# Patient Record
Sex: FEMALE | Race: WHITE | NOT HISPANIC OR LATINO | Employment: UNEMPLOYED | URBAN - METROPOLITAN AREA
[De-identification: names, ages, dates, MRNs, and addresses within clinical notes are randomized per-mention and may not be internally consistent; named-entity substitution may affect disease eponyms.]

---

## 2017-05-08 ENCOUNTER — OFFICE VISIT (OUTPATIENT)
Dept: FAMILY MEDICINE | Facility: CLINIC | Age: 57
End: 2017-05-08

## 2017-05-08 VITALS
HEART RATE: 68 BPM | BODY MASS INDEX: 27.98 KG/M2 | DIASTOLIC BLOOD PRESSURE: 100 MMHG | WEIGHT: 184.6 LBS | HEIGHT: 68 IN | TEMPERATURE: 97.5 F | SYSTOLIC BLOOD PRESSURE: 130 MMHG

## 2017-05-08 DIAGNOSIS — I10 ESSENTIAL HYPERTENSION, BENIGN: ICD-10-CM

## 2017-05-08 DIAGNOSIS — F41.9 ANXIETY: ICD-10-CM

## 2017-05-08 DIAGNOSIS — I49.3 PVC'S (PREMATURE VENTRICULAR CONTRACTIONS): ICD-10-CM

## 2017-05-08 DIAGNOSIS — I77.89 HYPERPLASIA OF RENAL ARTERY (H): ICD-10-CM

## 2017-05-08 PROCEDURE — 80048 BASIC METABOLIC PNL TOTAL CA: CPT | Mod: 90 | Performed by: FAMILY MEDICINE

## 2017-05-08 PROCEDURE — 36415 COLL VENOUS BLD VENIPUNCTURE: CPT | Performed by: FAMILY MEDICINE

## 2017-05-08 PROCEDURE — 99213 OFFICE O/P EST LOW 20 MIN: CPT | Performed by: FAMILY MEDICINE

## 2017-05-08 RX ORDER — SERTRALINE HYDROCHLORIDE 100 MG/1
100 TABLET, FILM COATED ORAL DAILY
Qty: 90 TABLET | Refills: 1 | Status: SHIPPED | OUTPATIENT
Start: 2017-05-08 | End: 2017-12-13

## 2017-05-08 RX ORDER — METOPROLOL SUCCINATE 50 MG/1
75 TABLET, EXTENDED RELEASE ORAL DAILY
Qty: 135 TABLET | Refills: 1 | Status: SHIPPED | OUTPATIENT
Start: 2017-05-08 | End: 2017-06-16

## 2017-05-08 RX ORDER — LISINOPRIL AND HYDROCHLOROTHIAZIDE 12.5; 2 MG/1; MG/1
1 TABLET ORAL DAILY
Qty: 90 TABLET | Refills: 1 | Status: SHIPPED | OUTPATIENT
Start: 2017-05-08 | End: 2017-06-16

## 2017-05-08 NOTE — PROGRESS NOTES
SUBJECTIVE:                                                    Ke Sloan is a 56 year old female who presents to clinic today for the following health issues:     Hypertension Follow-up      Outpatient blood pressures are not being checked.    Low Salt Diet: no added salt       Amount of exercise or physical activity: no regular exercise program- working long hours- poor life balance    Problems taking medications regularly: No    Medication side effects: none    Diet: low salt      Anxiety Follow-Up    Status since last visit: Worsened (work stress)    Other associated symptoms:None    Complicating factors:   Significant life event: Yes-  work   Current substance abuse: None  Depression symptoms: No  BRIAN-7 SCORE 11/13/2015 5/18/2016 11/14/2016   Total Score - - -   Total Score 3 0 1        GAD7        Problem list and histories reviewed & adjusted, as indicated.  Additional history: as documented    History of renal artery stenosis  With uncontrolled blood pressure, ?need for follow up imaging  Evaluated for possible sleep apnea last December- never had sleep study- wanted to see if weight loss changed her symptoms    History of PVC's:asymptomatic    Patient Active Problem List   Diagnosis     Other chronic allergic conjunctivitis     Essential hypertension, benign     ACP (advance care planning)     Frequent PVCs     Hyperplasia of renal artery/ angioplasty 2008 bilateral renal arteries     Health Care Home     Anxiety     Past Surgical History:   Procedure Laterality Date     C LIGATE FALLOPIAN TUBE      AGE 32     C NONSPECIFIC PROCEDURE  1998    LASIK EYE SURGERY     COLONOSCOPY  6/2011    Normal/ rpt in 10 yrs     HC PTA RENAL/VISCERAL ARTERY  2008    fibromuscular dysplasia, renal       Social History   Substance Use Topics     Smoking status: Never Smoker     Smokeless tobacco: Never Used     Alcohol use 0.5 oz/week     1 drink(s) per week      Comment: weekends     Family History   Problem Relation  "Age of Onset     Hypertension Mother      Hypertension Father      DIABETES Maternal Grandmother      INSULIN     CEREBROVASCULAR DISEASE Father      60 YEARS OLD WHEN HE HAD A CVA     Respiratory Paternal Grandfather      EMPHYSEMA     Thyroid Disease Sister      PARTIAL THYROIDECTOMY     CANCER Sister      thyroid     Hypertension Sister      DIABETES Mother      Respiratory Mother      emphysema     Anxiety Disorder Mother      Anxiety Disorder Sister      Sleep Apnea Father          Current Outpatient Prescriptions   Medication Sig Dispense Refill     sertraline (ZOLOFT) 100 MG tablet Take 1 tablet (100 mg) by mouth daily 90 tablet 1     metoprolol (TOPROL-XL) 50 MG 24 hr tablet Take 1.5 tablets (75 mg) by mouth daily 135 tablet 1     lisinopril-hydrochlorothiazide (PRINZIDE/ZESTORETIC) 20-12.5 MG per tablet Take 1 tablet by mouth daily 90 tablet 1     olopatadine HCl (PATADAY) 0.2 % SOLN Place 1 drop into both eyes daily 2.5 mL 3       Reviewed and updated as needed this visit by clinical staff  Tobacco  Allergies  Meds  Problems       Reviewed and updated as needed this visit by Provider    Doses both pills at night  HR 72    162/100         ROS:  C: NEGATIVE for fever, chills, change in weight  E/M: NEGATIVE for ear, mouth and throat problems  R: NEGATIVE for significant cough or SOB  CV: NEGATIVE for chest pain, palpitations or peripheral edema  PSYCHIATRIC: stressed- see above    OBJECTIVE:                                                    BP (!) 130/100 (BP Location: Left leg, Patient Position: Chair, Cuff Size: Adult Regular)  Pulse 68  Temp 97.5  F (36.4  C) (Oral)  Ht 1.715 m (5' 7.5\")  Wt 83.7 kg (184 lb 9.6 oz)  LMP 04/20/2013  Breastfeeding? No  BMI 28.49 kg/m2  Body mass index is 28.49 kg/(m^2).  No acute distress.  Regular rate and Rhythm- occasional PVC. no murmurs, clicks, gallops or rubs. No edema or JVD. Chest is clear; no wheezes or rales.  ALert and oriented times 3; Coherent " speech, normal rate and volume, able to articulate, logical thoughts, able to abstract reason, no tangential thoughts, no hallucinations or delusions. Affect is pleasant      Diagnostic Test Results:  Results for orders placed or performed in visit on 05/08/17   BASIC METABOLIC PANEL (QUEST)   Result Value Ref Range    Glucose 93 65 - 99 mg/dL    Urea Nitrogen 18 7 - 25 mg/dL    Creatinine 0.70 0.50 - 1.05 mg/dL    GFR Estimate 98 > OR = 60 mL/min/1.73m2    EGFR African American 113 > OR = 60 mL/min/1.73m2    BUN/Creatinine Ratio NOT APPLICABLE 6 - 22 (calc)    Sodium 138 135 - 146 mmol/L    Potassium 3.8 3.5 - 5.3 mmol/L    Chloride 102 98 - 110 mmol/L    Carbon Dioxide 27 20 - 31 mmol/L    Calcium 10.2 8.6 - 10.4 mg/dL        ASSESSMENT/PLAN:                                                      Problem List Items Addressed This Visit     Anxiety    Essential hypertension, benign      Other Visit Diagnoses     PVC's (premature ventricular contractions)                   MEDICATIONS:        - Increase metoprolol to 75 mg        - Continue other medications without change  FUTURE APPOINTMENTS:       - Follow-up visit in one month  Regular exercise  ?? Need for further renal artery evaluation    Natalia Ordonez MD  [facility]

## 2017-05-08 NOTE — MR AVS SNAPSHOT
After Visit Summary   5/8/2017    Ke Sloan    MRN: [id number]           Patient Information     Date Of Birth          1960        Visit Information        Provider Department      5/8/2017 5:15 PM Natalia Ordonez MD [facility]        Today's Diagnoses     Anxiety        Essential hypertension, benign        PVC's (premature ventricular contractions)          Care Instructions    Recheck blood pressure in one month (3-4 weeks)          Follow-ups after your visit        Your next 10 appointments already scheduled     Jun 06, 2017  2:00 PM CDT   Return Sleep Patient with Bennett Ezra Goltz, PA-C   [facility] ([facility])    [address] [place] [address] [phone]              Who to contact     If you have questions or need follow up information about today's clinic visit or your schedule please contact [facility] directly at [phone].  Normal or non-critical lab and imaging results will be communicated to you by [email or web address], letter or phone within 4 business days after the clinic has received the results. If you do not hear from us within 7 days, please contact the clinic through [employer] or phone. If you have a critical or abnormal lab result, we will notify you by phone as soon as possible.  Submit refill requests through [employer] or call your pharmacy and they will forward the refill request to us. Please allow 3 business days for your refill to be completed.          Additional Information About Your Visit        MyChart Information     [employer] gives you secure access to your electronic health record. If you see a primary care provider, you can also send messages to your care team and make appointments. If you have questions, please call your primary care clinic.  If you do not have a primary care provider, please call [phone] and they will assist you.        Care  "EveryWhere ID     This is your Care EveryWhere ID. This could be used by other organizations to access your [facility] medical records  [id number]        Your Vitals Were     Pulse Temperature Height Last Period Breastfeeding? BMI (Body Mass Index)    68 97.5  F (36.4  C) (Oral) 1.715 m (5' 7.5\") 04/20/2013 No 28.49 kg/m2       Blood Pressure from Last 3 Encounters:   05/08/17 (!) 130/100   12/07/16 114/80   12/06/16 131/85    Weight from Last 3 Encounters:   05/08/17 83.7 kg (184 lb 9.6 oz)   12/07/16 82.3 kg (181 lb 6.4 oz)   12/06/16 83.5 kg (184 lb)              We Performed the Following     BASIC METABOLIC PANEL (QUEST)     VENOUS COLLECTION          Today's Medication Changes          These changes are accurate as of: 5/8/17  6:06 PM.  If you have any questions, ask your nurse or doctor.               These medicines have changed or have updated prescriptions.        Dose/Directions    metoprolol 50 MG 24 hr tablet   Commonly known as:  TOPROL-XL   This may have changed:  how much to take   Used for:  Essential hypertension, benign, PVC's (premature ventricular contractions)   Changed by:  Natalia Ordonez MD        Dose:  75 mg   Take 1.5 tablets (75 mg) by mouth daily   Quantity:  135 tablet   Refills:  1            Where to get your medicines      These medications were sent to [place] [address] [place] [address] [address]    Hours:  Tech issues with their phone system Phone:  [phone]     lisinopril-hydrochlorothiazide 20-12.5 MG per tablet    metoprolol 50 MG 24 hr tablet    sertraline 100 MG tablet                Primary Care Provider Office Phone # Fax #    Natalia Ordonez [phone]       [facility] [address] [facility] [address] [facility] [facility] [address]  [facility] [address]        Thank you!     Thank you for choosing [facility]  for your care. Our goal is always to provide you with excellent care. Hearing " back from our patients is one way we can continue to improve our services. Please take a few minutes to complete the written survey that you may receive in the mail after your visit with us. Thank you!             Your Updated Medication List - Protect others around you: Learn how to safely use, store and throw away your medicines at www.disposemymeds.org.          This list is accurate as of: 5/8/17  6:06 PM.  Always use your most recent med list.                   Brand Name Dispense Instructions for use    lisinopril-hydrochlorothiazide 20-12.5 MG per tablet    PRINZIDE/ZESTORETIC    90 tablet    Take 1 tablet by mouth daily       metoprolol 50 MG 24 hr tablet    TOPROL-XL    135 tablet    Take 1.5 tablets (75 mg) by mouth daily       olopatadine HCl 0.2 % Soln    PATADAY    2.5 mL    Place 1 drop into both eyes daily       sertraline 100 MG tablet    ZOLOFT    90 tablet    Take 1 tablet (100 mg) by mouth daily

## 2017-05-08 NOTE — NURSING NOTE
Ke is here for a medication recheck.     Pre-Visit Screening :  Immunizations : up to date    Colonoscopy : is up to date  Mammogram : is up to date  Asthma Action Test/Plan : na  PHQ9/GAD7 :  Na    Pulse - regular  My Chart - accepts    CLASSIFICATION OF OVERWEIGHT AND OBESITY BY BMI                         Obesity Class           BMI(kg/m2)  Underweight                                    < 18.5  Normal                                         18.5-24.9  Overweight                                     25.0-29.9  OBESITY                     I                  30.0-34.9                              II                 35.0-39.9  EXTREME OBESITY             III                >40                             Patient's  BMI Body mass index is 28.49 kg/(m^2).  http://hin.nhlbi.nih.gov/menuplanner/menu.cgi  Questioned patient about current smoking habits.  Pt. has never smoked.  AVINASH Colby ([facility])

## 2017-05-10 LAB
BUN SERPL-MCNC: 18 MG/DL (ref 7–25)
BUN/CREATININE RATIO: NORMAL (CALC) (ref 6–22)
CALCIUM SERPL-MCNC: 10.2 MG/DL (ref 8.6–10.4)
CHLORIDE SERPLBLD-SCNC: 102 MMOL/L (ref 98–110)
CO2 SERPL-SCNC: 27 MMOL/L (ref 20–31)
CREAT SERPL-MCNC: 0.7 MG/DL (ref 0.5–1.05)
EGFR AFRICAN AMERICAN - QUEST: 113 ML/MIN/1.73M2
GFR SERPL CREATININE-BSD FRML MDRD: 98 ML/MIN/1.73M2
GLUCOSE - QUEST: 93 MG/DL (ref 65–99)
POTASSIUM SERPL-SCNC: 3.8 MMOL/L (ref 3.5–5.3)
SODIUM SERPL-SCNC: 138 MMOL/L (ref 135–146)

## 2017-06-09 ENCOUNTER — OFFICE VISIT (OUTPATIENT)
Dept: FAMILY MEDICINE | Facility: CLINIC | Age: 57
End: 2017-06-09

## 2017-06-09 VITALS
OXYGEN SATURATION: 97 % | SYSTOLIC BLOOD PRESSURE: 140 MMHG | TEMPERATURE: 97.9 F | HEART RATE: 73 BPM | WEIGHT: 185.6 LBS | HEIGHT: 68 IN | DIASTOLIC BLOOD PRESSURE: 110 MMHG | BODY MASS INDEX: 28.13 KG/M2

## 2017-06-09 DIAGNOSIS — I10 HYPERTENSION, UNCONTROLLED: Primary | ICD-10-CM

## 2017-06-09 PROCEDURE — 99213 OFFICE O/P EST LOW 20 MIN: CPT | Performed by: FAMILY MEDICINE

## 2017-06-09 NOTE — PROGRESS NOTES
"SUBJECTIVE:  57 year old female presents for recheck of her blood pressure after an increase in dose of metoprolol-  More tired on the medication-   Job stress- raising beta blocker had little impact on blood pressure- more side effects  History of FMD- renal artery angioplasty 2007    She had a mild headache this week  She denies chest pain  Did have a palpitation in the last month-?? PVC   l    Lab Results   Component Value Date    CR 0.70 05/08/2017     OBJECTIVE:  BP (!) 140/110 (BP Location: Left arm, Patient Position: Chair, Cuff Size: Adult Regular)  Pulse 73  Temp 97.9  F (36.6  C) (Oral)  Ht 1.715 m (5' 7.5\")  Wt 84.2 kg (185 lb 9.6 oz)  LMP 04/20/2013  SpO2 97%  Breastfeeding? No  BMI 28.64 kg/m2     No exam -    More than 50% of visit spent in counseling.  We talked about options for treatment:  1) referral to vascular surgeon- ? Need to reevaluate renal arteries  2) increase dose of ace  3) consult with Dr Jin to help guide treatment- further heart and renal evaluations- chosen option  No change in medications today as I expect Dr Jin will have recommendations-  Referral sent  4) manage stress   "

## 2017-06-09 NOTE — NURSING NOTE
Ke is here for a blood pressure recheck.     Pre-Visit Screening :  Immunizations : up to date    Colonoscopy : Up to date  Mammogram : is up to date  Asthma Action Test/Plan : Na  PHQ9/GAD7 :  utd    Pulse - regular  My Chart - accepts    CLASSIFICATION OF OVERWEIGHT AND OBESITY BY BMI                         Obesity Class           BMI(kg/m2)  Underweight                                    < 18.5  Normal                                         18.5-24.9  Overweight                                     25.0-29.9  OBESITY                     I                  30.0-34.9                              II                 35.0-39.9  EXTREME OBESITY             III                >40                             Patient's  BMI Body mass index is 28.64 kg/(m^2).  http://hin.nhlbi.nih.gov/menuplanner/menu.cgi  Questioned patient about current smoking habits.  Pt. has never smoked.    AVINASH Colby ([facility])

## 2017-06-09 NOTE — MR AVS SNAPSHOT
After Visit Summary   6/9/2017    Ke Sloan    MRN: [id number]           Patient Information     Date Of Birth          1960        Visit Information        Provider Department      6/9/2017 8:30 AM Sterner, Natalia J, MD [facility]        Today's Diagnoses     Hypertension, uncontrolled    -  1       Follow-ups after your visit        Additional Services     CARDIOLOGY EVAL ADULT REFERRAL       Your provider has referred you to:  [facility]: [facility] [phone]   [email or web address]    Call patient mobile phone for appointment  [phone]    Please be aware that coverage of these services is subject to the terms and limitations of your health insurance plan.  Call member services at your health plan with any benefit or coverage questions.      Type of Referral:  New Cardiology Consult: DR DYER REQUESTED    Timeframe requested:  1-2 Weeks  UNCONTROLLED BLOOD PRESSURE; HIGH DIASTOLIC  HISTORY OF FMD RENAL ARTERIES: ANGIOPLASTY 2007  Please bring the following to your appointment:  >>   Any x-rays, CTs or MRIs which have been performed.  Contact the facility where they were done to arrange for  prior to your scheduled appointment.    >>   List of current medications  >>   This referral request   >>   Any documents/labs given to you for this referral                  Your next 10 appointments already scheduled     Jun 13, 2017  2:00 PM CDT   Return Sleep Patient with Bennett Ezra Goltz, PA-C   [facility] ([facility])    [address] [place] [address] [phone]              Who to contact     If you have questions or need follow up information about today's clinic visit or your schedule please contact [facility] directly at [phone].  Normal or non-critical lab and imaging results  "will be communicated to you by MyChart, letter or phone within 4 business days after the clinic has received the results. If you do not hear from us within 7 days, please contact the clinic through [employer] or phone. If you have a critical or abnormal lab result, we will notify you by phone as soon as possible.  Submit refill requests through [employer] or call your pharmacy and they will forward the refill request to us. Please allow 3 business days for your refill to be completed.          Additional Information About Your Visit        [employer] Information     [employer] gives you secure access to your electronic health record. If you see a primary care provider, you can also send messages to your care team and make appointments. If you have questions, please call your primary care clinic.  If you do not have a primary care provider, please call [phone] and they will assist you.        Care EveryWhere ID     This is your Care EveryWhere ID. This could be used by other organizations to access your Center medical records  [id number]        Your Vitals Were     Pulse Temperature Height Last Period Pulse Oximetry Breastfeeding?    73 97.9  F (36.6  C) (Oral) 1.715 m (5' 7.5\") 04/20/2013 97% No    BMI (Body Mass Index)                   28.64 kg/m2            Blood Pressure from Last 3 Encounters:   06/09/17 (!) 140/110   05/08/17 (!) 130/100   12/07/16 114/80    Weight from Last 3 Encounters:   06/09/17 84.2 kg (185 lb 9.6 oz)   05/08/17 83.7 kg (184 lb 9.6 oz)   12/07/16 82.3 kg (181 lb 6.4 oz)              We Performed the Following     CARDIOLOGY EVAL ADULT REFERRAL        Primary Care Provider Office Phone # Fax #    Natalia Ordonez [phone]       [facility] [address] [facility] [address]  [facility] [address]        Thank you!     Thank you for choosing [facility]  for your care. Our goal is always to provide you with excellent care. Hearing back from our " patients is one way we can continue to improve our services. Please take a few minutes to complete the written survey that you may receive in the mail after your visit with us. Thank you!             Your Updated Medication List - Protect others around you: Learn how to safely use, store and throw away your medicines at www.disposemymeds.org.          This list is accurate as of: 6/9/17  9:01 AM.  Always use your most recent med list.                   Brand Name Dispense Instructions for use    lisinopril-hydrochlorothiazide 20-12.5 MG per tablet    PRINZIDE/ZESTORETIC    90 tablet    Take 1 tablet by mouth daily       metoprolol 50 MG 24 hr tablet    TOPROL-XL    135 tablet    Take 1.5 tablets (75 mg) by mouth daily       olopatadine HCl 0.2 % Soln    PATADAY    2.5 mL    Place 1 drop into both eyes daily       sertraline 100 MG tablet    ZOLOFT    90 tablet    Take 1 tablet (100 mg) by mouth daily

## 2017-06-12 ENCOUNTER — MYC MEDICAL ADVICE (OUTPATIENT)
Dept: FAMILY MEDICINE | Facility: CLINIC | Age: 57
End: 2017-06-12

## 2017-06-12 NOTE — TELEPHONE ENCOUNTER
From: Ke Sloan  To: Natalai Ordonez MD  Sent: 6/12/2017 6:38 AM CDT  Subject: Appointment Made Per Your Request    You asked that I let you know when my appointment was made. It is Wednesday at 1:00 at [facility].

## 2017-06-13 ENCOUNTER — OFFICE VISIT (OUTPATIENT)
Dept: SLEEP MEDICINE | Facility: CLINIC | Age: 57
End: 2017-06-13
Payer: COMMERCIAL

## 2017-06-13 VITALS
HEART RATE: 69 BPM | BODY MASS INDEX: 29.03 KG/M2 | HEIGHT: 67 IN | OXYGEN SATURATION: 98 % | DIASTOLIC BLOOD PRESSURE: 87 MMHG | WEIGHT: 185 LBS | SYSTOLIC BLOOD PRESSURE: 136 MMHG

## 2017-06-13 DIAGNOSIS — I49.3 FREQUENT PVCS: ICD-10-CM

## 2017-06-13 DIAGNOSIS — R06.83 SNORING: Primary | ICD-10-CM

## 2017-06-13 DIAGNOSIS — I10 ESSENTIAL HYPERTENSION, BENIGN: ICD-10-CM

## 2017-06-13 DIAGNOSIS — Z82.0 FAMILY HISTORY OF SLEEP APNEA: ICD-10-CM

## 2017-06-13 PROCEDURE — 99213 OFFICE O/P EST LOW 20 MIN: CPT | Performed by: PHYSICIAN ASSISTANT

## 2017-06-13 RX ORDER — ZOLPIDEM TARTRATE 5 MG/1
TABLET ORAL
Qty: 1 TABLET | Refills: 0 | Status: SHIPPED | OUTPATIENT
Start: 2017-06-13 | End: 2017-07-31

## 2017-06-13 NOTE — PROGRESS NOTES
Sleep Study Follow-Up Visit:    Date on this visit: 6/13/2017    Ke Sloan comes in today for follow-up of her weight loss efforts to reduce symptoms of possible sleep apnea. She was initially seen at the [facility] for irregular breathing in sleep and daytime tiredness for 1-2 years. Her medical history is significant for HTN, anxiety. Her father has TRA.  At her initial consult last December, she weighed 184. Today, she weighs 185.  Her blood pressure has been higher. Increased metoprolol just makes her more tired. She even fell asleep at her desk recently (just the one time after a medication change), despite her work being very stressful. She is snoring. Her  again says she breathes funny. She falls asleep on the couch watching TV every night.     Past medical/surgical history, family history, social history, medications and allergies were reviewed.      Problem List:  Patient Active Problem List    Diagnosis Date Noted     Anxiety 07/22/2015     Priority: Medium     Health Care Home 11/05/2013     Priority: Medium     State Tier Level:  Tier 1  Status:  N/A  Care Coordinator:  N/A    See Letters for H Care Plan           Hyperplasia of renal artery/ angioplasty 2008 bilateral renal arteries 05/27/2013     Priority: Medium     Frequent PVCs 11/15/2012     Priority: Medium     ACP (advance care planning) 01/20/2012     Priority: Medium     Advance Care Planning:   ACP Review and Resources Provided:  Reviewed chart for advance care plan.  Ke Sloan has no plan or code status on file however states presence of ACP document. Copy requested. Confirmed code status reflects current choices pending receipt of document/advance care plan review. Confirmed/documented designated decision maker(s). See permanent comments section of demographics in clinical tab.   Added by Ct Cedillo on 5/30/2014               Essential hypertension, benign 01/06/2008     Priority: Medium     Other  chronic allergic conjunctivitis 07/21/2002     Priority: Medium        Impression/Plan:    (R06.83) Snoring  (primary encounter diagnosis), (I10) Essential hypertension, benign, (I49.3) Frequent PVCs, (Z82.0) Family history of sleep apnea  Comment: Ke returns after working on weight loss over the last 6 months. Her efforts have not been successful. She continues to snore and have irregular breathing. She is not observed to have suhail pauses in her breathing. Her HTN has worsened and she has been sleepy in the daytime, although that may be at least in part related to medication). Other positive risk factors include age >50 (57). Her ESS was 14/24 at her initial consult. Negative risk factors include neck <40 cm (38 cm), BMI <35 (28), female gender and no observed apnea.  Plan: Comprehensive Sleep Study        Split night PSG with CPAP/BiPAP titration if indicated. A prescription was given for a 5 mg tab of zolpidem.       She will follow up with me in about 2 week(s).     Fifteen minutes spent with patient, all of which were spent face-to-face counseling, consulting, coordinating plan of care.      Bennett Goltz, PA-C    CC: Bennett Ezra Goltz

## 2017-06-13 NOTE — MR AVS SNAPSHOT
After Visit Summary   6/13/2017    Ke Sloan    MRN: [id number]           Patient Information     Date Of Birth          1960        Visit Information        Provider Department      6/13/2017 2:00 PM Goltz, Bennett Ezra, PA-C [facility]        Today's Diagnoses     Snoring    -  1    Essential hypertension, benign        Frequent PVCs        Family history of sleep apnea          Care Instructions      Your BMI is Body mass index is 28.56 kg/(m^2).  Weight management is a personal decision.  If you are interested in exploring weight loss strategies, the following discussion covers the approaches that may be successful. Body mass index (BMI) is one way to tell whether you are at a healthy weight, overweight, or obese. It measures your weight in relation to your height.  A BMI of 18.5 to 24.9 is in the healthy range. A person with a BMI of 25 to 29.9 is considered overweight, and someone with a BMI of 30 or greater is considered obese. More than two-thirds of American adults are considered overweight or obese.  Being overweight or obese increases the risk for further weight gain. Excess weight may lead to heart disease and diabetes.  Creating and following plans for healthy eating and physical activity may help you improve your health.  Weight control is part of healthy lifestyle and includes exercise, emotional health, and healthy eating habits. Careful eating habits lifelong are the mainstay of weight control. Though there are significant health benefits from weight loss, long-term weight loss with diet alone may be very difficult to achieve- studies show long-term success with dietary management in less than 10% of people. Attaining a healthy weight may be especially difficult to achieve in those with severe obesity. In some cases, medications, devices and surgical management might be considered.  What can you do?  If you are overweight or obese and are interested in  methods for weight loss, you should discuss this with your provider.     Consider reducing daily calorie intake by 500 calories.     Keep a food journal.     Avoiding skipping meals, consider cutting portions instead.    Diet combined with exercise helps maintain muscle while optimizing fat loss. Strength training is particularly important for building and maintaining muscle mass. Exercise helps reduce stress, increase energy, and improves fitness. Increasing exercise without diet control, however, may not burn enough calories to loose weight.       Start walking three days a week 10-20 minutes at a time    Work towards walking thirty minutes five days a week     Eventually, increase the speed of your walking for 1-2 minutes at time    In addition, we recommend that you review healthy lifestyles and methods for weight loss available through the National Institutes of Health patient information sites:  http://win.niddk.nih.gov/publications/index.htm    And look into health and wellness programs that may be available through your health insurance provider, employer, local community center, or lu club.    Weight management plan: Patient was referred to their PCP to discuss a diet and exercise plan.              Follow-ups after your visit        Follow-up notes from your care team     Return in about 2 weeks (around 6/27/2017) for Sleep Study Review.      Your next 10 appointments already scheduled     Jun 14, 2017  1:00 PM CDT   US RENAL COMPLETE WITH DUPLEX COMPLETE with SHUS5   [facility] Ultrasound ([facility])    [address] [place] [address] [phone]           Please bring a list of your medicines (including vitamins, minerals and over-the-counter drugs). Also, tell your doctor about any allergies you may have. Wear comfortable clothes and leave your valuables at home.  Adults: No eating or drinking for 8 hours before the exam. You may take medicine with a small  sip of water.  Children: - Children 6+ years: No food or drink for 6 hours before exam. - Children 1-5 years: No food or drink for 4 hours before exam. - Infants, breast-fed: may have breast milk up to 2 hours before exam. - Infants, formula: may have bottle until 4 hours before exam.  Please call the Imaging Department at your exam site with any questions.            Jul 18, 2017  8:30 PM CDT   PSG Split with BED 4 SH SLEEP   [facility])    [address]  [facility] [address]   [phone]            Aug 01, 2017  3:30 PM CDT   Return Sleep Patient with Bennett Ezra Goltz, PA-C   [facility])    [address]  [facility] [address]   [phone]              Future tests that were ordered for you today     Open Future Orders        Priority Expected Expires Ordered    Comprehensive Sleep Study Routine  12/10/2017 6/13/2017            Who to contact     If you have questions or need follow up information about today's clinic visit or your schedule please contact [facility] directly at [phone].  Normal or non-critical lab and imaging results will be communicated to you by [employer], letter or phone within 4 business days after the clinic has received the results. If you do not hear from us within 7 days, please contact the clinic through [employer] or phone. If you have a critical or abnormal lab result, we will notify you by phone as soon as possible.  Submit refill requests through "[employer] or call your pharmacy and they will forward the refill request to us. Please allow 3 business days for your refill to be completed.          Additional Information About Your Visit        "[employer] Information     "[employer] gives you secure access to your electronic health record. If you see a primary care provider, you can also send messages to your care team and make appointments.  "If you have questions, please call your primary care clinic.  If you do not have a primary care provider, please call [phone] and they will assist you.        Care EveryWhere ID     This is your Care EveryWhere ID. This could be used by other organizations to access your [facility] medical records  [id number]        Your Vitals Were     Pulse Height Last Period Pulse Oximetry BMI (Body Mass Index)       69 1.714 m (5' 7.48\") 04/20/2013 98% 28.56 kg/m2        Blood Pressure from Last 3 Encounters:   06/13/17 136/87   06/09/17 (!) 140/110   05/08/17 (!) 130/100    Weight from Last 3 Encounters:   06/13/17 83.9 kg (185 lb)   06/09/17 84.2 kg (185 lb 9.6 oz)   05/08/17 83.7 kg (184 lb 9.6 oz)                 Today's Medication Changes          These changes are accurate as of: 6/13/17  2:11 PM.  If you have any questions, ask your nurse or doctor.               Start taking these medicines.        Dose/Directions    zolpidem 5 MG tablet   Commonly known as:  AMBIEN        Take tablet by mouth 15 minutes prior to sleep, for Sleep Study   Quantity:  1 tablet   Refills:  0            Where to get your medicines      Some of these will need a paper prescription and others can be bought over the counter.  Ask your nurse if you have questions.     Bring a paper prescription for each of these medications     zolpidem 5 MG tablet                Primary Care Provider Office Phone # Fax #    Natalia Ordonez [phone]       [place] [address] [place] [address]        Thank you!     Thank you for choosing [facility]  for your care. Our goal is always to provide you with excellent care. Hearing back from our patients is one way we can continue to improve our services. Please take a few minutes to complete the written survey that you may receive in the mail after your visit with us. Thank you!             Your Updated Medication List - Protect others " around you: Learn how to safely use, store and throw away your medicines at www.disposemymeds.org.          This list is accurate as of: 6/13/17  2:11 PM.  Always use your most recent med list.                   Brand Name Dispense Instructions for use    lisinopril-hydrochlorothiazide 20-12.5 MG per tablet    PRINZIDE/ZESTORETIC    90 tablet    Take 1 tablet by mouth daily       metoprolol 50 MG 24 hr tablet    TOPROL-XL    135 tablet    Take 1.5 tablets (75 mg) by mouth daily       olopatadine HCl 0.2 % Soln    PATADAY    2.5 mL    Place 1 drop into both eyes daily       sertraline 100 MG tablet    ZOLOFT    90 tablet    Take 1 tablet (100 mg) by mouth daily       zolpidem 5 MG tablet    AMBIEN    1 tablet    Take tablet by mouth 15 minutes prior to sleep, for Sleep Study

## 2017-06-13 NOTE — PATIENT INSTRUCTIONS

## 2017-06-13 NOTE — NURSING NOTE
"Chief Complaint   Patient presents with     Sleep Problem     Follow up previous office visit       Initial /87  Pulse 69  Ht 1.714 m (5' 7.48\")  Wt 83.9 kg (185 lb)  LMP 04/20/2013  SpO2 98%  BMI 28.56 kg/m2 Estimated body mass index is 28.56 kg/(m^2) as calculated from the following:    Height as of this encounter: 1.714 m (5' 7.48\").    Weight as of this encounter: 83.9 kg (185 lb).  Medication Reconciliation: complete   ESS 16/24  Constance Anderson MA      "

## 2017-06-14 ENCOUNTER — PRE VISIT (OUTPATIENT)
Dept: CARDIOLOGY | Facility: CLINIC | Age: 57
End: 2017-06-14

## 2017-06-14 ENCOUNTER — HOSPITAL ENCOUNTER (OUTPATIENT)
Dept: ULTRASOUND IMAGING | Facility: CLINIC | Age: 57
Discharge: HOME | End: 2017-06-14
Attending: FAMILY MEDICINE | Admitting: FAMILY MEDICINE
Payer: COMMERCIAL

## 2017-06-14 DIAGNOSIS — I77.89 HYPERPLASIA OF RENAL ARTERY (H): ICD-10-CM

## 2017-06-14 PROCEDURE — 93975 VASCULAR STUDY: CPT | Mod: TC

## 2017-06-16 ENCOUNTER — OFFICE VISIT (OUTPATIENT)
Dept: CARDIOLOGY | Facility: CLINIC | Age: 57
End: 2017-06-16
Payer: COMMERCIAL

## 2017-06-16 VITALS
HEIGHT: 68 IN | WEIGHT: 186 LBS | HEART RATE: 70 BPM | BODY MASS INDEX: 28.19 KG/M2 | DIASTOLIC BLOOD PRESSURE: 74 MMHG | SYSTOLIC BLOOD PRESSURE: 122 MMHG

## 2017-06-16 DIAGNOSIS — I49.3 FREQUENT PVCS: ICD-10-CM

## 2017-06-16 DIAGNOSIS — I49.3 PVC'S (PREMATURE VENTRICULAR CONTRACTIONS): ICD-10-CM

## 2017-06-16 DIAGNOSIS — I10 ESSENTIAL HYPERTENSION, BENIGN: Primary | ICD-10-CM

## 2017-06-16 DIAGNOSIS — I77.3 FIBROMUSCULAR DYSPLASIA OF RENAL ARTERY (H): ICD-10-CM

## 2017-06-16 PROCEDURE — 93005 ELECTROCARDIOGRAM TRACING: CPT | Performed by: INTERNAL MEDICINE

## 2017-06-16 PROCEDURE — 99213 OFFICE O/P EST LOW 20 MIN: CPT | Mod: 25 | Performed by: INTERNAL MEDICINE

## 2017-06-16 RX ORDER — LISINOPRIL AND HYDROCHLOROTHIAZIDE 12.5; 2 MG/1; MG/1
1-2 TABLET ORAL DAILY
Qty: 180 TABLET | Refills: 3 | Status: SHIPPED | OUTPATIENT
Start: 2017-06-16 | End: 2018-04-23

## 2017-06-16 RX ORDER — METOPROLOL SUCCINATE 50 MG/1
50 TABLET, EXTENDED RELEASE ORAL DAILY
Qty: 90 TABLET | Refills: 3 | Status: SHIPPED | OUTPATIENT
Start: 2017-06-16 | End: 2018-04-23

## 2017-06-16 NOTE — PATIENT INSTRUCTIONS
Please decrease the TOPROL back down to 50mg daily.  Try increasing the LISINOPRIL-HCTZ to 1.5 pills daily.  We will have you schedule an MRA of the head to look for aneurysms that sometimes are associated with FMD of kidney arteries.

## 2017-06-16 NOTE — LETTER
"6/16/2017    Natalia Ordonez MD  [facility]  [address]  [place] [address]    RE: Ke Sloan       Dear Colleague,    I had the pleasure of seeing Ke Sloan in the [facility].    Vascular Cardiology Consultation      Ke Sloan MRN# [id number]   YOB: 1960 Age: 57 year old   Date of Visit 06/16/2017     Reason for consult:  history of fibromuscular dysplasia of the renal arteries and hypertension            Assessment and Plan:     1. History of fibromuscular dysplasia status post PTCA bilateral 2008    Renal arteries with mildly elevated velocities but without focal acceleration.    Blood pressure has been reasonably easy to control and creatinine remains normal.    Discussed that we will continue to watch and increase her lisinopril/hydrochlorothiazide and decrease her Toprol due to fatigue.  This will likely keep diastolic pressures at goal.    Due to history of fibromuscular dysplasia, will check her carotids and cerebral arteries to rule out fibromuscular dysplasia and/or cerebral aneurysms.    Patient may follow up as needed    This note was transcribed using electronic voice recognition software, typographical errors may be present.                Chief Complaint:   Heart Problem ( renal artery stenosis, high diastolic B/P)           History of Present Illness:   This patient is a very pleasant 57 year old female     No exertional chest pain  No PND / orthopnea  No presyncope / syncope  No significant palpitations           Physical Exam:     Vitals: /74  Pulse 70  Ht 1.715 m (5' 7.5\")  Wt 84.4 kg (186 lb)  LMP 04/20/2013  BMI 28.7 kg/m2  Constitutional:  cooperative, alert and oriented, well developed, well nourished, in no acute distress        Skin:  warm and dry to the touch, no apparent skin lesions or masses noted        Head:  normocephalic, no masses or lesions        Eyes:  pupils equal and " round, conjunctivae and lids unremarkable, sclera white, no xanthalasma, EOMS intact, no nystagmus        ENT:  no pallor or cyanosis, dentition good        Neck:  JVP normal        Chest:  normal breath sounds, clear to auscultation, normal A-P diameter, normal symmetry, normal respiratory excursion, no use of accessory muscles        Cardiac: regular rhythm occasional premature beats     early systolic murmur;grade 1;RUSB          Abdomen:      benign    Vascular:                                        Extremities and Back:  no deformities, clubbing, cyanosis, erythema observed;no edema        Neurological:  affect appropriate, oriented to time, person and place;no gross motor deficits                    Past Medical History:   I have reviewed this patient's past medical history  Past Medical History:   Diagnosis Date     MIGRAINE NOS W/O MENTN INTRACTABLE 7/21/2002             Past Surgical History:   I have reviewed this patient's past surgical history  Past Surgical History:   Procedure Laterality Date     C LIGATE FALLOPIAN TUBE      AGE 32     C NONSPECIFIC PROCEDURE  1998    LASIK EYE SURGERY     COLONOSCOPY  6/2011    Normal/ rpt in 10 yrs     HC PTA RENAL/VISCERAL ARTERY  2008    fibromuscular dysplasia, renal               Social History:   I have reviewed this patient's social history  Social History   Substance Use Topics     Smoking status: Never Smoker     Smokeless tobacco: Never Used     Alcohol use 0.5 oz/week     1 Standard drinks or equivalent per week      Comment: weekends             Family History:   I have reviewed this patient's family history  Family History   Problem Relation Age of Onset     Hypertension Mother      DIABETES Mother      Respiratory Mother      emphysema     Anxiety Disorder Mother      Hypertension Father      CEREBROVASCULAR DISEASE Father      60 YEARS OLD WHEN HE HAD A CVA     Sleep Apnea Father      DIABETES Maternal Grandmother      INSULIN     Respiratory Paternal  Grandfather      EMPHYSEMA     Thyroid Disease Sister      PARTIAL THYROIDECTOMY     CANCER Sister      thyroid     Hypertension Sister      Anxiety Disorder Sister              Allergies:     Allergies   Allergen Reactions     Sulfa Drugs              Medications:   I have reviewed this patient's current medications  Current Outpatient Prescriptions   Medication Sig Dispense Refill     metoprolol (TOPROL-XL) 50 MG 24 hr tablet Take 1 tablet (50 mg) by mouth daily 90 tablet 3     lisinopril-hydrochlorothiazide (PRINZIDE/ZESTORETIC) 20-12.5 MG per tablet Take 1-2 tablets by mouth daily 180 tablet 3     sertraline (ZOLOFT) 100 MG tablet Take 1 tablet (100 mg) by mouth daily 90 tablet 1     olopatadine HCl (PATADAY) 0.2 % SOLN Place 1 drop into both eyes daily 2.5 mL 3     zolpidem (AMBIEN) 5 MG tablet Take tablet by mouth 15 minutes prior to sleep, for Sleep Study (Patient not taking: Reported on 6/16/2017) 1 tablet 0     [DISCONTINUED] metoprolol (TOPROL-XL) 50 MG 24 hr tablet Take 1.5 tablets (75 mg) by mouth daily 135 tablet 1     [DISCONTINUED] lisinopril-hydrochlorothiazide (PRINZIDE/ZESTORETIC) 20-12.5 MG per tablet Take 1 tablet by mouth daily 90 tablet 1               Review of Systems:     Review of Systems:  Skin:  Negative for     Eyes:  Positive for visual blurring  ENT:  Negative for    Respiratory:  Negative for    Cardiovascular:    palpitations;fatigue;Positive for  Gastroenterology:      Genitourinary:       Musculoskeletal:  Negative    Neurologic:  Positive for headaches  Psychiatric:  Positive for anxiety  Heme/Lymph/Imm:  Positive for allergies  Endocrine:  Negative                       Data:   All laboratory data reviewed  Lab Results   Component Value Date    CHOL 210 12/07/2016     Lab Results   Component Value Date    HDL 42 12/07/2016     Lab Results   Component Value Date     12/07/2016     Lab Results   Component Value Date    TRIG 106 12/07/2016     Lab Results   Component Value  Date    CHOLHDLRATIO 5.0 12/07/2016     TSH   Date Value Ref Range Status   11/13/2015 1.31 mIU/L Final     Comment:               Reference Range                         > or = 20 Years  0.40-4.50                              Pregnancy Ranges            First trimester    0.26-2.66            Second trimester   0.55-2.73            Third trimester    0.43-2.91       Last Basic Metabolic Panel:  Lab Results   Component Value Date     05/08/2017      Lab Results   Component Value Date    POTASSIUM 3.8 05/08/2017     Lab Results   Component Value Date    CHLORIDE 102 05/08/2017     Lab Results   Component Value Date    GERRI 10.2 05/08/2017     Lab Results   Component Value Date    CO2 27 05/08/2017     Lab Results   Component Value Date    BUN 18 05/08/2017    BUN NOT APPLICABLE 05/08/2017     Lab Results   Component Value Date    CR 0.70 05/08/2017     Lab Results   Component Value Date    GLC 93 05/08/2017    GLC 98.0 06/27/2001     Lab Results   Component Value Date    WBC 11.8 09/29/2014     Lab Results   Component Value Date    RBC 5.03 09/29/2014     Lab Results   Component Value Date    HGB 15.4 09/29/2014     Lab Results   Component Value Date    HCT 47.7 09/29/2014     Lab Results   Component Value Date    MCV 94.9 09/29/2014     Lab Results   Component Value Date    MCH 30.6 09/29/2014     Lab Results   Component Value Date    MCHC 32.3 09/29/2014     Lab Results   Component Value Date    RDW 11.6 09/29/2014     Lab Results   Component Value Date     09/29/2014       Thank you for allowing me to participate in the care of your patient.    Sincerely,     Jesse Jin MD     [facility]

## 2017-06-16 NOTE — MR AVS SNAPSHOT
After Visit Summary   6/16/2017    Ke Sloan    MRN: [id number]           Patient Information     Date Of Birth          1960        Visit Information        Provider Department      6/16/2017 2:15 PM Jesse Jin MD [facility] AT [place]        Today's Diagnoses     Essential hypertension, benign    -  1    Frequent PVCs        PVC's (premature ventricular contractions)        Fibromuscular dysplasia of renal artery (H)          Care Instructions    Please decrease the TOPROL back down to 50mg daily.  Try increasing the LISINOPRIL-HCTZ to 1.5 pills daily.  We will have you schedule an MRA of the head to look for aneurysms that sometimes are associated with FMD of kidney arteries.            Follow-ups after your visit        Your next 10 appointments already scheduled     Jun 19, 2017  5:30 PM CDT   MR HEAD W/O CONTRAST ANGIOGRAM with [id number]   [facility] ([facility])    [address]  [facility] [address] [phone]           Take your medicines as usual, unless your doctor tells you not to. Bring a list of your current medicines to your exam (including vitamins, minerals and over-the-counter drugs). Also bring the results of similar scans you may have had.  Please remove any body piercings and hair extensions before you arrive.  Follow your doctor s orders. If you do not, we may have to postpone your exam.  You will not have contrast for this exam. You do not need to do anything special to prepare.  The MRI machine uses a strong magnet. Please wear clothes without metal (snaps, zippers). A sweatsuit works well, or we may give you a hospital gown.   **IMPORTANT** THE INSTRUCTIONS BELOW ARE ONLY FOR THOSE PATIENTS WHO HAVE BEEN TOLD THEY WILL RECEIVE SEDATION OR GENERAL ANESTHESIA DURING THEIR MRI PROCEDURE:  IF YOU WILL RECEIVE SEDATION (take medicine to help you relax during your  exam):   You must get the medicine from your doctor before you arrive. Bring the medicine to the exam. Do not take it at home.   Arrive one hour early. Bring someone who can take you home after the test. Your medicine will make you sleepy. After the exam, you may not drive, take a bus or take a taxi by yourself.   No eating 8 hours before your exam. You may have clear liquids up until 4 hours before your exam. (Clear liquids include water, clear tea, black coffee and fruit juice without pulp.)  IF YOU WILL RECEIVE ANESTHESIA (be asleep for your exam):   Arrive 1 1/2 hours early. Bring someone who can take you home after the test. You may not drive, take a bus or take a taxi by yourself.   No eating 8 hours before your exam. You may have clear liquids up until 4 hours before your exam. (Clear liquids include water, clear tea, black coffee and fruit juice without pulp.)   You will spend four to five hours in the recovery room.  Please call the Imaging Department at your exam site with any questions.            Jul 18, 2017  8:30 PM CDT   PSG Split with BED 4 SH SLEEP   [facility])    [address]  [facility] [place] [address]   [phone]            Aug 01, 2017  3:30 PM CDT   Return Sleep Patient with Bennett Ezra Goltz, PA-C   [facility])    [address]  [place] [address]   [phone]              Future tests that were ordered for you today     Open Future Orders        Priority Expected Expires Ordered    MRI Angiogram head w/o contrast* Routine 6/16/2017 6/16/2018 6/16/2017            Who to contact     If you have questions or need follow up information about today's clinic visit or your schedule please contact [facility] PHYSICI[facility] HE[facility] AT [place] directly at [phone].  Normal or non-critical lab and imaging results will be communicated to you by Susan  "letter or phone within 4 business days after the clinic has received the results. If you do not hear from us within 7 days, please contact the clinic through Vibrynt or phone. If you have a critical or abnormal lab result, we will notify you by phone as soon as possible.  Submit refill requests through Vibrynt or call your pharmacy and they will forward the refill request to us. Please allow 3 business days for your refill to be completed.          Additional Information About Your Visit        UniSmartharWe Heart It Information     Vibrynt gives you secure access to your electronic health record. If you see a primary care provider, you can also send messages to your care team and make appointments. If you have questions, please call your primary care clinic.  If you do not have a primary care provider, please call [phone] and they will assist you.        Care EveryWhere ID     This is your Care EveryWhere ID. This could be used by other organizations to access your [place] medical records  [id number]        Your Vitals Were     Pulse Height Last Period BMI (Body Mass Index)          70 1.715 m (5' 7.5\") 04/20/2013 28.7 kg/m2         Blood Pressure from Last 3 Encounters:   06/16/17 122/74   06/13/17 136/87   06/09/17 (!) 140/110    Weight from Last 3 Encounters:   06/16/17 84.4 kg (186 lb)   06/13/17 83.9 kg (185 lb)   06/09/17 84.2 kg (185 lb 9.6 oz)              We Performed the Following     EKG 12-lead complete w/read - Clinics (performed today)          Today's Medication Changes          These changes are accurate as of: 6/16/17  3:07 PM.  If you have any questions, ask your nurse or doctor.               These medicines have changed or have updated prescriptions.        Dose/Directions    lisinopril-hydrochlorothiazide 20-12.5 MG per tablet   Commonly known as:  PRINZIDE/ZESTORETIC   This may have changed:  how much to take   Used for:  Essential hypertension, benign   Changed by:  Jesse Jin MD        " Dose:  1-2 tablet   Take 1-2 tablets by mouth daily   Quantity:  180 tablet   Refills:  3       metoprolol 50 MG 24 hr tablet   Commonly known as:  TOPROL-XL   This may have changed:  how much to take   Used for:  Essential hypertension, benign, PVC's (premature ventricular contractions)   Changed by:  Jesse Jin MD        Dose:  50 mg   Take 1 tablet (50 mg) by mouth daily   Quantity:  90 tablet   Refills:  3            Where to get your medicines      These medications were sent to [place] [address] [place] [facility] [address] [facility]  [address] [facility] [address]    Hours:  Tech issues with their phone system Phone:  [phone]     lisinopril-hydrochlorothiazide 20-12.5 MG per tablet    metoprolol 50 MG 24 hr tablet                Primary Care Provider Office Phone # Fax #    Natalia Ordonez [phone]       [facility] PH[facility]IC [address] [facility] [address]  [facility] [address]        Thank you!     Thank you for choosing [facility] PHYSICIANS HEART AT [place]  for your care. Our goal is always to provide you with excellent care. Hearing back from our patients is one way we can continue to improve our services. Please take a few minutes to complete the written survey that you may receive in the mail after your visit with us. Thank you!             Your Updated Medication List - Protect others around you: Learn how to safely use, store and throw away your medicines at www.disposemymeds.org.          This list is accurate as of: 6/16/17  3:07 PM.  Always use your most recent med list.                   Brand Name Dispense Instructions for use    lisinopril-hydrochlorothiazide 20-12.5 MG per tablet    PRINZIDE/ZESTORETIC    180 tablet    Take 1-2 tablets by mouth daily       metoprolol 50 MG 24 hr tablet    TOPROL-XL    90 tablet    Take 1 tablet (50 mg) by mouth daily       olopatadine HCl 0.2 % Soln    PATADAY    2.5 mL    Place 1 drop into both  eyes daily       sertraline 100 MG tablet    ZOLOFT    90 tablet    Take 1 tablet (100 mg) by mouth daily       zolpidem 5 MG tablet    AMBIEN    1 tablet    Take tablet by mouth 15 minutes prior to sleep, for Sleep Study

## 2017-06-16 NOTE — PROGRESS NOTES
"Vascular Cardiology Consultation      Ke Sloan MRN# [id number]   YOB: 1960 Age: 57 year old   Date of Visit 06/16/2017     Reason for consult:  history of fibromuscular dysplasia of the renal arteries and hypertension            Assessment and Plan:     1. History of fibromuscular dysplasia status post PTCA bilateral 2008    Renal arteries with mildly elevated velocities but without focal acceleration.    Blood pressure has been reasonably easy to control and creatinine remains normal.    Discussed that we will continue to watch and increase her lisinopril/hydrochlorothiazide and decrease her Toprol due to fatigue.  This will likely keep diastolic pressures at goal.    Due to history of fibromuscular dysplasia, will check her carotids and cerebral arteries to rule out fibromuscular dysplasia and/or cerebral aneurysms.    Patient may follow up as needed    This note was transcribed using electronic voice recognition software, typographical errors may be present.                Chief Complaint:   Heart Problem ( renal artery stenosis, high diastolic B/P)           History of Present Illness:   This patient is a very pleasant 57 year old female     No exertional chest pain  No PND / orthopnea  No presyncope / syncope  No significant palpitations           Physical Exam:     Vitals: /74  Pulse 70  Ht 1.715 m (5' 7.5\")  Wt 84.4 kg (186 lb)  LMP 04/20/2013  BMI 28.7 kg/m2  Constitutional:  cooperative, alert and oriented, well developed, well nourished, in no acute distress        Skin:  warm and dry to the touch, no apparent skin lesions or masses noted        Head:  normocephalic, no masses or lesions        Eyes:  pupils equal and round, conjunctivae and lids unremarkable, sclera white, no xanthalasma, EOMS intact, no nystagmus        ENT:  no pallor or cyanosis, dentition good        Neck:  JVP normal        Chest:  normal breath sounds, clear to auscultation, normal A-P diameter, " normal symmetry, normal respiratory excursion, no use of accessory muscles        Cardiac: regular rhythm occasional premature beats     early systolic murmur;grade 1;RUSB          Abdomen:      benign    Vascular:                                        Extremities and Back:  no deformities, clubbing, cyanosis, erythema observed;no edema        Neurological:  affect appropriate, oriented to time, person and place;no gross motor deficits                    Past Medical History:   I have reviewed this patient's past medical history  Past Medical History:   Diagnosis Date     MIGRAINE NOS W/O MENTN INTRACTABLE 7/21/2002             Past Surgical History:   I have reviewed this patient's past surgical history  Past Surgical History:   Procedure Laterality Date     C LIGATE FALLOPIAN TUBE      AGE 32     C NONSPECIFIC PROCEDURE  1998    LASIK EYE SURGERY     COLONOSCOPY  6/2011    Normal/ rpt in 10 yrs     HC PTA RENAL/VISCERAL ARTERY  2008    fibromuscular dysplasia, renal               Social History:   I have reviewed this patient's social history  Social History   Substance Use Topics     Smoking status: Never Smoker     Smokeless tobacco: Never Used     Alcohol use 0.5 oz/week     1 Standard drinks or equivalent per week      Comment: weekends             Family History:   I have reviewed this patient's family history  Family History   Problem Relation Age of Onset     Hypertension Mother      DIABETES Mother      Respiratory Mother      emphysema     Anxiety Disorder Mother      Hypertension Father      CEREBROVASCULAR DISEASE Father      60 YEARS OLD WHEN HE HAD A CVA     Sleep Apnea Father      DIABETES Maternal Grandmother      INSULIN     Respiratory Paternal Grandfather      EMPHYSEMA     Thyroid Disease Sister      PARTIAL THYROIDECTOMY     CANCER Sister      thyroid     Hypertension Sister      Anxiety Disorder Sister              Allergies:     Allergies   Allergen Reactions     Sulfa Drugs               Medications:   I have reviewed this patient's current medications  Current Outpatient Prescriptions   Medication Sig Dispense Refill     metoprolol (TOPROL-XL) 50 MG 24 hr tablet Take 1 tablet (50 mg) by mouth daily 90 tablet 3     lisinopril-hydrochlorothiazide (PRINZIDE/ZESTORETIC) 20-12.5 MG per tablet Take 1-2 tablets by mouth daily 180 tablet 3     sertraline (ZOLOFT) 100 MG tablet Take 1 tablet (100 mg) by mouth daily 90 tablet 1     olopatadine HCl (PATADAY) 0.2 % SOLN Place 1 drop into both eyes daily 2.5 mL 3     zolpidem (AMBIEN) 5 MG tablet Take tablet by mouth 15 minutes prior to sleep, for Sleep Study (Patient not taking: Reported on 6/16/2017) 1 tablet 0     [DISCONTINUED] metoprolol (TOPROL-XL) 50 MG 24 hr tablet Take 1.5 tablets (75 mg) by mouth daily 135 tablet 1     [DISCONTINUED] lisinopril-hydrochlorothiazide (PRINZIDE/ZESTORETIC) 20-12.5 MG per tablet Take 1 tablet by mouth daily 90 tablet 1               Review of Systems:     Review of Systems:  Skin:  Negative for     Eyes:  Positive for visual blurring  ENT:  Negative for    Respiratory:  Negative for    Cardiovascular:    palpitations;fatigue;Positive for  Gastroenterology:      Genitourinary:       Musculoskeletal:  Negative    Neurologic:  Positive for headaches  Psychiatric:  Positive for anxiety  Heme/Lymph/Imm:  Positive for allergies  Endocrine:  Negative                       Data:   All laboratory data reviewed  Lab Results   Component Value Date    CHOL 210 12/07/2016     Lab Results   Component Value Date    HDL 42 12/07/2016     Lab Results   Component Value Date     12/07/2016     Lab Results   Component Value Date    TRIG 106 12/07/2016     Lab Results   Component Value Date    CHOLHDLRATIO 5.0 12/07/2016     TSH   Date Value Ref Range Status   11/13/2015 1.31 mIU/L Final     Comment:               Reference Range                         > or = 20 Years  0.40-4.50                              Pregnancy Ranges             First trimester    0.26-2.66            Second trimester   0.55-2.73            Third trimester    0.43-2.91       Last Basic Metabolic Panel:  Lab Results   Component Value Date     05/08/2017      Lab Results   Component Value Date    POTASSIUM 3.8 05/08/2017     Lab Results   Component Value Date    CHLORIDE 102 05/08/2017     Lab Results   Component Value Date    GERRI 10.2 05/08/2017     Lab Results   Component Value Date    CO2 27 05/08/2017     Lab Results   Component Value Date    BUN 18 05/08/2017    BUN NOT APPLICABLE 05/08/2017     Lab Results   Component Value Date    CR 0.70 05/08/2017     Lab Results   Component Value Date    GLC 93 05/08/2017    GLC 98.0 06/27/2001     Lab Results   Component Value Date    WBC 11.8 09/29/2014     Lab Results   Component Value Date    RBC 5.03 09/29/2014     Lab Results   Component Value Date    HGB 15.4 09/29/2014     Lab Results   Component Value Date    HCT 47.7 09/29/2014     Lab Results   Component Value Date    MCV 94.9 09/29/2014     Lab Results   Component Value Date    MCH 30.6 09/29/2014     Lab Results   Component Value Date    MCHC 32.3 09/29/2014     Lab Results   Component Value Date    RDW 11.6 09/29/2014     Lab Results   Component Value Date     09/29/2014

## 2017-06-19 ENCOUNTER — HOSPITAL ENCOUNTER (OUTPATIENT)
Dept: MRI IMAGING | Facility: CLINIC | Age: 57
Discharge: HOME | End: 2017-06-19
Attending: INTERNAL MEDICINE | Admitting: INTERNAL MEDICINE
Payer: COMMERCIAL

## 2017-06-19 DIAGNOSIS — I77.3 FIBROMUSCULAR DYSPLASIA OF RENAL ARTERY (H): ICD-10-CM

## 2017-06-19 PROCEDURE — 70544 MR ANGIOGRAPHY HEAD W/O DYE: CPT

## 2017-07-18 ENCOUNTER — THERAPY VISIT (OUTPATIENT)
Dept: SLEEP MEDICINE | Facility: CLINIC | Age: 57
End: 2017-07-18
Payer: COMMERCIAL

## 2017-07-18 DIAGNOSIS — I49.3 FREQUENT PVCS: ICD-10-CM

## 2017-07-18 DIAGNOSIS — I10 ESSENTIAL HYPERTENSION, BENIGN: ICD-10-CM

## 2017-07-18 DIAGNOSIS — R06.83 SNORING: ICD-10-CM

## 2017-07-18 DIAGNOSIS — Z82.0 FAMILY HISTORY OF SLEEP APNEA: ICD-10-CM

## 2017-07-18 PROCEDURE — 95810 POLYSOM 6/> YRS 4/> PARAM: CPT | Performed by: PSYCHIATRY & NEUROLOGY

## 2017-07-18 NOTE — MR AVS SNAPSHOT
After Visit Summary   7/18/2017    Ke Sloan    MRN: [id number]           Patient Information     Date Of Birth          1960        Visit Information        Provider Department      7/18/2017 8:30 PM BED 4 SH SLEEP [facility]        Today's Diagnoses     Snoring        Essential hypertension, benign        Frequent PVCs        Family history of sleep apnea          Care Instructions    Diagnostic PSG completed per provider order.  Patient did not meet criteria for PAP therapy.          Follow-ups after your visit        Your next 10 appointments already scheduled     Jul 31, 2017  3:30 PM CDT   Return Sleep Patient with Bennett Ezra Goltz, PA-C   [facility] ([facility])    [address] [address] [phone]              Who to contact     If you have questions or need follow up information about today's clinic visit or your schedule please contact [facility] directly at [phone].  Normal or non-critical lab and imaging results will be communicated to you by Elimihart, letter or phone within 4 business days after the clinic has received the results. If you do not hear from us within 7 days, please contact the clinic through [email or web address] or phone. If you have a critical or abnormal lab result, we will notify you by phone as soon as possible.  Submit refill requests through AREVS or call your pharmacy and they will forward the refill request to us. Please allow 3 business days for your refill to be completed.          Additional Information About Your Visit        MyChart Information     AREVS gives you secure access to your electronic health record. If you see a primary care provider, you can also send messages to your care team and make appointments. If you have questions, please call your primary care clinic.  If you do not have a primary care provider, please call [phone] and  they will assist you.        Care EveryWhere ID     This is your Care EveryWhere ID. This could be used by other organizations to access your [place] medical records  [id number]        Your Vitals Were     Last Period                   04/20/2013            Blood Pressure from Last 3 Encounters:   06/16/17 122/74   06/13/17 136/87   06/09/17 (!) 140/110    Weight from Last 3 Encounters:   06/16/17 84.4 kg (186 lb)   06/13/17 83.9 kg (185 lb)   06/09/17 84.2 kg (185 lb 9.6 oz)              We Performed the Following     Comprehensive Sleep Study        Primary Care Provider Office Phone # Fax #    Natalia Ordonez [phone]       [facility] [address]  [facility] [address]        Equal Access to Services     MARIKA GALEANA : Hadii aad ku hadasho Soomaali, waaxda luqadaha, qaybta kaalmada adeegyada, stephanie grimes hayesthela glover . So [facility] [phone].    ATENCIÓN: Si habla español, tiene a martinez disposición servicios gratuitos de asistencia lingüística. Harry al [phone].    We comply with applicable federal civil rights laws and [place] laws. We do not discriminate on the basis of race, color, national origin, age, disability sex, sexual orientation or gender identity.            Thank you!     Thank you for choosing [facility]  for your care. Our goal is always to provide you with excellent care. Hearing back from our patients is one way we can continue to improve our services. Please take a few minutes to complete the written survey that you may receive in the mail after your visit with us. Thank you!             Your Updated Medication List - Protect others around you: Learn how to safely use, store and throw away your medicines at www.disposemymeds.org.          This list is accurate as of: 7/18/17 11:59 PM.  Always use your most recent med list.                   Brand Name Dispense Instructions for use Diagnosis     lisinopril-hydrochlorothiazide 20-12.5 MG per tablet    PRINZIDE/ZESTORETIC    180 tablet    Take 1-2 tablets by mouth daily    Essential hypertension, benign       metoprolol 50 MG 24 hr tablet    TOPROL-XL    90 tablet    Take 1 tablet (50 mg) by mouth daily    Essential hypertension, benign, PVC's (premature ventricular contractions)       olopatadine HCl 0.2 % Soln    PATADAY    2.5 mL    Place 1 drop into both eyes daily    Other chronic allergic conjunctivitis       sertraline 100 MG tablet    ZOLOFT    90 tablet    Take 1 tablet (100 mg) by mouth daily    Anxiety       zolpidem 5 MG tablet    AMBIEN    1 tablet    Take tablet by mouth 15 minutes prior to sleep, for Sleep Study

## 2017-07-21 NOTE — PROCEDURES
" SLEEP STUDY INTERPRETATION  POLYSOMNOGRAPHY REPORT      Patient: Ke Sloan  YOB: 1960  Study Date: 7/18/2017  MRN: [id number]  Referring Provider: MD Ordonez Barbara  Ordering Provider: Goltz, PA-C, Bennett    Indications for Polysomnography: The patient is a 57 y old Female who is 5' 7\" and weighs 185.0 lbs.  Her BMI is 29.0, [place] sleepiness scale 14.0 and neck size is 38cm.  Relevant medical history includes snoring, ? apneas. A diagnostic polysomnogram was performed to evaluate for sleep apnea.    Polysomnogram Data:  A full night polysomnogram recorded the standard physiologic parameters including EEG, EOG, EMG, ECG, nasal and oral airflow.  Respiratory parameters of chest and abdominal movements were recorded with respiratory inductance plethysmography.  Oxygen saturation was recorded by pulse oximetry.      Sleep Architecture: Sleep fragmentation  The total recording time of the polysomnogram was 399.5 minutes.  The total sleep time was 357.5 minutes.  Sleep latency was 1.4 minutes w.  REM latency was 230.0 minutes.  Arousal index was 22.0 arousals per hour.  Sleep efficiency was 89.5%.  Wake after sleep onset was 39.5 minutes.  The patient spent 5.6% of total sleep time in Stage N1, 32.7% in Stage N2, 32.3% in Stages N3, and 29.4% in REM.  Time in REM supine was 27.5 minutes.    Respiration: Mild TRA    Events - The polysomnogram revealed a presence of 26 obstructive, 13 central, and - mixed apneas resulting in an apnea index of 6.5 events per hour.  There were 26 hypopneas resulting in a hypopnea index of 4.4 events per hour.  The combined apnea/hypopnea index was 10.9 events per hour.  The REM AHI was 21.7 events per hour.  The supine AHI was 11.3 events per hour.  The RERA index was 2.9 events per hour.   The RDI was 13.8 events per hour.    Snoring - was reported as loud.    Respiratory rate and pattern - was notable for normal respiratory rate and pattern.    Sustained Sleep " Associated Hypoventilation - Transcutaneous carbon dioxide monitoring was not used.    Sleep Associated Hypoxemia - (Greater than 5 minutes O2 sat below 89%) was not present.  Baseline oxygen saturation was 93.1%. Lowest oxygen saturation was 83.4%.  Time spent less than or equal to 88% was 1.1 minutes.  Time spent less than or equal to 89% was 2.6 minutes.  13.8 2.9 10.9     Movement Activity:     Periodic Limb Activity - There were 43 PLMs during the entire study. The PLM index was 7.2 movements per hour.  The PLM Arousal Index was 0.2 per hour.    REM EMG Activity - Excessive muscle activity was not present.    Nocturnal Behavior - Abnormal sleep related behaviors were not noted.    Bruxism - None apparent.    Cardiac Summary: Sinus with PVCs  The average pulse rate was 64.8 bpm.  The minimum pulse rate was 52.0 bpm while the maximum pulse rate was 89.7 bpm.     Assessment:     Mild TRA    Recommendations:    If deemed clinically relevant Mild TRA can be treated with the following options:  o Dental Appliance  o Auto CPAP  o Upper Airway Surgery  o Position restriction device (such as a ZZOMA) to prevent the patient from sleeping supine     Advise regarding the risks of drowsy driving.    Suggest optimizing sleep schedule and avoiding sleep deprivation.    Diagnostic Code(s): G47.33, G47.9      Emmanuel Pandey MD 7-21-17

## 2017-07-31 ENCOUNTER — OFFICE VISIT (OUTPATIENT)
Dept: SLEEP MEDICINE | Facility: CLINIC | Age: 57
End: 2017-07-31
Payer: COMMERCIAL

## 2017-07-31 VITALS
SYSTOLIC BLOOD PRESSURE: 127 MMHG | HEIGHT: 67 IN | OXYGEN SATURATION: 97 % | BODY MASS INDEX: 29.35 KG/M2 | DIASTOLIC BLOOD PRESSURE: 86 MMHG | WEIGHT: 187 LBS | HEART RATE: 70 BPM

## 2017-07-31 DIAGNOSIS — G47.33 OSA (OBSTRUCTIVE SLEEP APNEA): Primary | ICD-10-CM

## 2017-07-31 PROCEDURE — 99213 OFFICE O/P EST LOW 20 MIN: CPT | Performed by: PHYSICIAN ASSISTANT

## 2017-07-31 NOTE — NURSING NOTE
"Chief Complaint   Patient presents with     Study Results     psg follow up        Initial /86  Pulse 70  Ht 1.714 m (5' 7.48\")  Wt 84.8 kg (187 lb)  LMP 04/20/2013  SpO2 97%  BMI 28.87 kg/m2 Estimated body mass index is 28.87 kg/(m^2) as calculated from the following:    Height as of this encounter: 1.714 m (5' 7.48\").    Weight as of this encounter: 84.8 kg (187 lb).  Medication Reconciliation: complete   Constance Anderson MA      "

## 2017-07-31 NOTE — MR AVS SNAPSHOT
After Visit Summary   7/31/2017    Ke Sloan    MRN: [id number]           Patient Information     Date Of Birth          1960        Visit Information        Provider Department      7/31/2017 3:30 PM Goltz, Bennett Ezra, PA-C [facility]        Today's Diagnoses     TRA (obstructive sleep apnea)    -  1      Care Instructions      Your BMI is Body mass index is 28.87 kg/(m^2).  Weight management is a personal decision.  If you are interested in exploring weight loss strategies, the following discussion covers the approaches that may be successful. Body mass index (BMI) is one way to tell whether you are at a healthy weight, overweight, or obese. It measures your weight in relation to your height.  A BMI of 18.5 to 24.9 is in the healthy range. A person with a BMI of 25 to 29.9 is considered overweight, and someone with a BMI of 30 or greater is considered obese. More than two-thirds of American adults are considered overweight or obese.  Being overweight or obese increases the risk for further weight gain. Excess weight may lead to heart disease and diabetes.  Creating and following plans for healthy eating and physical activity may help you improve your health.  Weight control is part of healthy lifestyle and includes exercise, emotional health, and healthy eating habits. Careful eating habits lifelong are the mainstay of weight control. Though there are significant health benefits from weight loss, long-term weight loss with diet alone may be very difficult to achieve- studies show long-term success with dietary management in less than 10% of people. Attaining a healthy weight may be especially difficult to achieve in those with severe obesity. In some cases, medications, devices and surgical management might be considered.  What can you do?  If you are overweight or obese and are interested in methods for weight loss, you should discuss this with your provider.      Consider reducing daily calorie intake by 500 calories.     Keep a food journal.     Avoiding skipping meals, consider cutting portions instead.    Diet combined with exercise helps maintain muscle while optimizing fat loss. Strength training is particularly important for building and maintaining muscle mass. Exercise helps reduce stress, increase energy, and improves fitness. Increasing exercise without diet control, however, may not burn enough calories to loose weight.       Start walking three days a week 10-20 minutes at a time    Work towards walking thirty minutes five days a week     Eventually, increase the speed of your walking for 1-2 minutes at time    In addition, we recommend that you review healthy lifestyles and methods for weight loss available through the National Institutes of Health patient information sites:  http://win.niddk.nih.gov/publications/index.htm    And look into health and wellness programs that may be available through your health insurance provider, employer, local community center, or lu club.    Weight management plan: Patient was referred to their PCP to discuss a diet and exercise plan.              Follow-ups after your visit        Follow-up notes from your care team     Return if symptoms worsen or fail to improve.      Who to contact     If you have questions or need follow up information about today's clinic visit or your schedule please contact [facility] directly at [phone].  Normal or non-critical lab and imaging results will be communicated to you by MyChart, letter or phone within 4 business days after the clinic has received the results. If you do not hear from us within 7 days, please contact the clinic through MyChart or phone. If you have a critical or abnormal lab result, we will notify you by phone as soon as possible.  Submit refill requests through [employer] or call your pharmacy and they will forward the refill request to us. Please  "allow 3 business days for your refill to be completed.          Additional Information About Your Visit        MyChart Information     [employer] gives you secure access to your electronic health record. If you see a primary care provider, you can also send messages to your care team and make appointments. If you have questions, please call your primary care clinic.  If you do not have a primary care provider, please call [phone] and they will assist you.        Care EveryWhere ID     This is your Care EveryWhere ID. This could be used by other organizations to access your [facility] medical records  [id number]        Your Vitals Were     Pulse Height Last Period Pulse Oximetry BMI (Body Mass Index)       70 1.714 m (5' 7.48\") 04/20/2013 97% 28.87 kg/m2        Blood Pressure from Last 3 Encounters:   07/31/17 127/86   06/16/17 122/74   06/13/17 136/87    Weight from Last 3 Encounters:   07/31/17 84.8 kg (187 lb)   06/16/17 84.4 kg (186 lb)   06/13/17 83.9 kg (185 lb)              Today, you had the following     No orders found for display         Today's Medication Changes          These changes are accurate as of: 7/31/17  4:52 PM.  If you have any questions, ask your nurse or doctor.               Stop taking these medicines if you haven't already. Please contact your care team if you have questions.     zolpidem 5 MG tablet   Commonly known as:  AMBIEN                    Primary Care Provider Office Phone # Fax #    Natalia Ordonez [phone]       [facility] [address]  [facility] [address]        Equal Access to Services     [facility]: Hadii pramod sagastume hadashluis Sokamran, waaxda luqadaha, qaybta kaalmada stephanie moreno. So [facility] [phone].    ATENCIÓN: Si habla español, tiene a martinez disposición servicios gratuitos de asistencia lingüística. Llame al [phone].    We comply with applicable federal civil rights laws and " [place] laws. We do not discriminate on the basis of race, color, national origin, age, disability sex, sexual orientation or gender identity.            Thank you!     Thank you for choosing [facility]  for your care. Our goal is always to provide you with excellent care. Hearing back from our patients is one way we can continue to improve our services. Please take a few minutes to complete the written survey that you may receive in the mail after your visit with us. Thank you!             Your Updated Medication List - Protect others around you: Learn how to safely use, store and throw away your medicines at www.disposemymeds.org.          This list is accurate as of: 7/31/17  4:52 PM.  Always use your most recent med list.                   Brand Name Dispense Instructions for use Diagnosis    lisinopril-hydrochlorothiazide 20-12.5 MG per tablet    PRINZIDE/ZESTORETIC    180 tablet    Take 1-2 tablets by mouth daily    Essential hypertension, benign       metoprolol 50 MG 24 hr tablet    TOPROL-XL    90 tablet    Take 1 tablet (50 mg) by mouth daily    Essential hypertension, benign, PVC's (premature ventricular contractions)       olopatadine HCl 0.2 % Soln    PATADAY    2.5 mL    Place 1 drop into both eyes daily    Other chronic allergic conjunctivitis       sertraline 100 MG tablet    ZOLOFT    90 tablet    Take 1 tablet (100 mg) by mouth daily    Anxiety

## 2017-07-31 NOTE — PATIENT INSTRUCTIONS

## 2017-07-31 NOTE — PROGRESS NOTES
Sleep Study Follow-Up Visit:    Date on this visit: 7/31/2017    Ke Sloan comes in today for follow-up of her sleep study done on 7/18/2017 at the [facility] for irregular breathing in sleep and daytime tiredness for 1-2 years. Her medical history is significant for HTN, anxiety. Her father has TRA.    Sleep latency 1.4 minutes without Ambien.  REM achieved.   REM latency 230 minutes.  Sleep efficiency 89.5%. Total sleep time 357.5 minutes.    Sleep architecture:  Stage 1, 5.6% (5%), stage 2, 32.7% (45-55%), stage 3, 32.3% (15-20%), stage REM, 29.4% (20-25%).  AHI was 10.9/hr (15 of 65 events were transitional central events), with desaturations down to 83%. She spent 2.6 minutes below 90% SpO2. RDI 13.8/hr.  REM RDI 24/hr, consistent with moderate REM TRA. Her REM supine RDI was 72/hr in 27.5/ minutes. Supine RDI 15.4/hr, consistent with mild SUPINE TRA. Her non-supine RDI was 12/hr but half of those were the central events. Periodic Limb Movement Index 7.2/hour.       CPAP titration:  CPAP was not initiated. These findings were reviewed with the patient.  She is starting [employer] tomorrow.     Past medical/surgical history, family history, social history, medications and allergies were reviewed.      Problem List:  Patient Active Problem List    Diagnosis Date Noted     Anxiety 07/22/2015     Priority: Medium     Health Care Home 11/05/2013     Priority: Medium     State Tier Level:  Tier 1  Status:  N/A  Care Coordinator:  N/A    See Letters for H Care Plan           Hyperplasia of renal artery/ angioplasty 2008 bilateral renal arteries 05/27/2013     Priority: Medium     Frequent PVCs 11/15/2012     Priority: Medium     ACP (advance care planning) 01/20/2012     Priority: Medium     Advance Care Planning:   ACP Review and Resources Provided:  Reviewed chart for advance care plan.  Ke Sloan has no plan or code status on file however states presence of ACP document. Copy  requested. Confirmed code status reflects current choices pending receipt of document/advance care plan review. Confirmed/documented designated decision maker(s). See permanent comments section of demographics in clinical tab.   Added by Ct Cedillo on 5/30/2014               Essential hypertension, benign 01/06/2008     Priority: Medium     Other chronic allergic conjunctivitis 07/21/2002     Priority: Medium        Impression/Plan:    (G47.33) TRA (obstructive sleep apnea)  (primary encounter diagnosis)  Comment: mild TRA, severe in REM supine.   Plan: Ke would like to work on weight loss and positional restriction. She was not very interested in CPAP but may try the dental appliance in the future if the above treatments are not successful. She will contact me in about 2-3 months if she would like a dental referral.      She will follow up with me in the future as needed.     Fifteen minutes spent with patient, all of which were spent face-to-face counseling, consulting, coordinating plan of care.      Bennett Goltz, PA-C    CC: No ref. provider found

## 2017-08-01 PROBLEM — G47.33 OSA (OBSTRUCTIVE SLEEP APNEA): Status: ACTIVE | Noted: 2017-08-01

## 2017-10-11 ENCOUNTER — OFFICE VISIT (OUTPATIENT)
Dept: FAMILY MEDICINE | Facility: CLINIC | Age: 57
End: 2017-10-11

## 2017-10-11 VITALS
HEART RATE: 68 BPM | HEIGHT: 68 IN | TEMPERATURE: 98.1 F | RESPIRATION RATE: 20 BRPM | SYSTOLIC BLOOD PRESSURE: 132 MMHG | WEIGHT: 186.8 LBS | DIASTOLIC BLOOD PRESSURE: 80 MMHG | BODY MASS INDEX: 28.31 KG/M2

## 2017-10-11 DIAGNOSIS — Z01.818 PRE-OP EXAM: Primary | ICD-10-CM

## 2017-10-11 DIAGNOSIS — Z71.89 ACP (ADVANCE CARE PLANNING): ICD-10-CM

## 2017-10-11 DIAGNOSIS — I10 BENIGN ESSENTIAL HYPERTENSION: ICD-10-CM

## 2017-10-11 PROCEDURE — 36415 COLL VENOUS BLD VENIPUNCTURE: CPT | Performed by: PHYSICIAN ASSISTANT

## 2017-10-11 PROCEDURE — 99214 OFFICE O/P EST MOD 30 MIN: CPT | Performed by: PHYSICIAN ASSISTANT

## 2017-10-11 PROCEDURE — 80048 BASIC METABOLIC PNL TOTAL CA: CPT | Mod: 90 | Performed by: PHYSICIAN ASSISTANT

## 2017-10-11 NOTE — LETTER
[facility]  [address]  [address]  [facility] [address]  [phone]  Dept: [phone]    PRE-OP EVALUATION:  Today's date: 10/11/2017    Ke Sloan (: 1960) presents for pre-operative evaluation assessment as requested by Dr. Armstrong.  She requires evaluation and anesthesia risk assessment prior to undergoing surgery/procedure for treatment of eye .  Proposed procedure: Yag Capsulotomy    Date of Surgery/ Procedure: 10/18/17  Time of Surgery/ Procedure:   [place]ospital[place] [facility] [place]  Fax number for surgical facility: [phone]  Primary Physician: Natalia Ordonez  Type of Anesthesia Anticipated: to be determined    Patient has a Health Care Directive or Living Will:  YES     1. NO - Do you have a history of heart attack, stroke, stent, bypass or surgery on an artery in the head, neck, heart or legs?  2. NO - Do you ever have any pain or discomfort in your chest?  3. NO - Do you have a history of  Heart Failure?  4. NO - Are you troubled by shortness of breath when: walking on the level, up a slight hill or at night?  5. NO - Do you currently have a cold, bronchitis or other respiratory infection?  6. NO - Do you have a cough, shortness of breath or wheezing?  7. NO - Do you sometimes get pains in the calves of your legs when you walk?  8. YES - DO YOU OR ANYONE IN YOUR FAMILY HAVE PREVIOUS HISTORY OF BLOOD CLOTS? Believes mother had DVT- unsure of age, possible triggers  9. NO - Do you or does anyone in your family have a serious bleeding problem such as prolonged bleeding following surgeries or cuts?  10. NO - Have you ever had problems with anemia or been told to take iron pills?  11. NO - Have you had any abnormal blood loss such as black, tarry or bloody stools, or abnormal vaginal bleeding?  12. NO - Have you ever had a blood transfusion?  13. NO - Have you or any of your relatives ever had problems with anesthesia?  14. YES - DO YOU HAVE  SLEEP APNEA, EXCESSIVE SNORING OR DAYTIME DROWSINESS? Was recently diagnosed with borderline TRA. Does not use CPAP  15. NO - Do you have any prosthetic heart valves?  16. NO - Do you have prosthetic joints?  17. NO - Is there any chance that you may be pregnant?    HPI:                                                      Brief HPI related to upcoming procedure: Originally had cataract repair 9/2015. Unfortunately, was found to have small capsule formation in both eyes that need to be repaired with laser.      See problem list for active medical problems.  Problems all longstanding and stable, except as noted/documented.  See ROS for pertinent symptoms related to these conditions.                                                                                                  .    MEDICAL HISTORY:                                                    Patient Active Problem List    Diagnosis Date Noted     TRA (obstructive sleep apnea) 08/01/2017     Priority: Medium     Anxiety 07/22/2015     Priority: Medium     Health Care Home 11/05/2013     Priority: Medium     State Tier Level:  Tier 1  Status:  N/A  Care Coordinator:  N/A    See Letters for [facility] Care Plan       Hyperplasia of renal artery/ angioplasty 2008 bilateral renal arteries 05/27/2013     Priority: Medium     Frequent PVCs 11/15/2012     Priority: Medium     ACP (advance care planning) 01/20/2012     Priority: Medium     Advance Care Planning 10/11/2017: ACP Review of Chart / Resources Provided:  Reviewed chart for advance care plan.  Ke Sloan has no plan or code status on file however states presence of ACP document. Copy requested.   Added by Melanie Johnson     Essential hypertension, benign 01/06/2008=     Priority: Medium     Other chronic allergic conjunctivitis 07/21/2002     Priority: Medium      Past Medical History:   Diagnosis Date     MIGRAINE NOS W/O MENTN INTRACTABLE 7/21/2002     Past Surgical History:   Procedure Laterality Date  "    C LIGATE FALLOPIAN TUBE      AGE 32     C NONSPECIFIC PROCEDURE  1998    LASIK EYE SURGERY     COLONOSCOPY  6/2011    Normal/ rpt in 10 yrs     HC PTA RENAL/VISCERAL ARTERY  2008    fibromuscular dysplasia, renal     Current Outpatient Prescriptions   Medication Sig Dispense Refill     metoprolol (TOPROL-XL) 50 MG 24 hr tablet Take 1 tablet (50 mg) by mouth daily 90 tablet 3     lisinopril-hydrochlorothiazide (PRINZIDE/ZESTORETIC) 20-12.5 MG per tablet Take 1-2 tablets by mouth daily 180 tablet 3     sertraline (ZOLOFT) 100 MG tablet Take 1 tablet (100 mg) by mouth daily 90 tablet 1     olopatadine HCl (PATADAY) 0.2 % SOLN Place 1 drop into both eyes daily 2.5 mL 3     OTC products: None, except as noted above    Allergies   Allergen Reactions     Sulfa Drugs       Latex Allergy: NO    Social History   Substance Use Topics     Smoking status: Never Smoker     Smokeless tobacco: Never Used     Alcohol use 0.5 oz/week     1 Standard drinks or equivalent per week      Comment: weekends     History   Drug Use No     REVIEW OF SYSTEMS:                                                    Constitutional, neuro, ENT, endocrine, pulmonary, cardiac, gastrointestinal, genitourinary, musculoskeletal, integument and psychiatric systems are negative, except as otherwise noted.    EXAM:                                                    /80 (BP Location: Left arm, Patient Position: Chair, Cuff Size: Adult Regular)  Pulse 68  Temp 98.1  F (36.7  C) (Oral)  Resp 20  Ht 1.715 m (5' 7.5\")  Wt 84.7 kg (186 lb 12.8 oz)  LMP 04/20/2013  BMI 28.83 kg/m2    GENERAL APPEARANCE: healthy, alert and no distress     EYES: EOMI, PERRL     HENT: ear canals and TM's normal and nose and mouth without ulcers or lesions     NECK: no adenopathy, no asymmetry, masses, or scars and thyroid normal to palpation     RESP: lungs clear to auscultation - no rales, rhonchi or wheezes     CV: regular rates and rhythm, normal S1 S2, no S3 or S4 " and no murmur, click or rub     ABDOMEN:  soft, nontender, no HSM or masses and bowel sounds normal     MS: extremities normal- no gross deformities noted, no evidence of inflammation in joints, FROM in all extremities.     SKIN: no suspicious lesions or rashes     NEURO: Normal strength and tone, sensory exam grossly normal, mentation intact and speech normal     PSYCH: mentation appears normal. and affect normal/bright     LYMPHATICS: No axillary, cervical, or supraclavicular nodes    DIAGNOSTICS:                                                    EKG: Not indicated due to non-vascular surgery and low risk of event (age <65 and without cardiac risk factors)  Serum Potassium     Office Visit on 10/11/2017   Component Date Value Ref Range Status     Glucose 10/11/2017 89  65 - 99 mg/dL Final    Comment:               Fasting reference interval          Urea Nitrogen 10/11/2017 22  7 - 25 mg/dL Final     Creatinine 10/11/2017 0.80  0.50 - 1.05 mg/dL Final    Comment: For patients >49 years of age, the reference limit  for Creatinine is approximately 13% higher for people  identified as -American.          GFR Estimate 10/11/2017 82  > OR = 60 mL/min/1.73m2 Final     EGFR African American 10/11/2017 96  > OR = 60 mL/min/1.73m2 Final     BUN/Creatinine Ratio 10/11/2017 NOT APPLICABLE  6 - 22 (calc) Final     Sodium 10/11/2017 139  135 - 146 mmol/L Final     Potassium 10/11/2017 4.0  3.5 - 5.3 mmol/L Final     Chloride 10/11/2017 101  98 - 110 mmol/L Final     Carbon Dioxide 10/11/2017 28  20 - 31 mmol/L Final     Calcium 10/11/2017 9.8  8.6 - 10.4 mg/dL Final     No concerns with lab results   IMPRESSION:                                                    Reason for surgery/procedure: Yag laser capsulotomy  Diagnosis/reason for consult: Pre-operative exam    The proposed surgical procedure is considered LOW risk.    REVISED CARDIAC RISK INDEX  The patient has the following serious cardiovascular risks for  perioperative complications such as (MI, PE, VFib and 3  AV Block):  No serious cardiac risks  INTERPRETATION: 0 risks: Class I (very low risk - 0.4% complication rate)    The patient has the following additional risks for perioperative complications:  No identified additional risks      ICD-10-CM    1. Pre-op exam Z01.818 VENOUS COLLECTION     BASIC METABOLIC PANEL (QUEST)   2. ACP (advance care planning) Z71.89    3. Benign essential hypertension I10 VENOUS COLLECTION     BASIC METABOLIC PANEL (QUEST)     RECOMMENDATIONS:                                                    --Patient is to take all scheduled medications on the day of surgery EXCEPT for modifications listed below.    APPROVAL GIVEN to proceed with proposed procedure with appropriate sedation/anesthesia, without further diagnostic evaluation.    1. Avoid ibuprofen, ASA from now until surgery.    2. Do not eat anything after midnight  (robert of the surgery) and nothing the morning of the surgery.    3. Medications: Can take at night as normal.    4. Follow all instructions given by the surgery team. They usually give out a packet. Read it and please follow it precisely. This helps surgical experience and outcomes.    5. If you have any questions do not hesitate to call me or the surgeon/surgical team.    Mari Porter PA-C  [facility]     Signed Electronically by: Mari Porter PA-C    Copy of this evaluation report is provided to requesting physician.

## 2017-10-11 NOTE — MR AVS SNAPSHOT
After Visit Summary   10/11/2017    Ke Sloan    MRN: [id number]           Patient Information     Date Of Birth          1960        Visit Information        Provider Department      10/11/2017 11:00 AM Mari Porter PA-C [facility]        Today's Diagnoses     Pre-op exam    -  1    Benign essential hypertension        ACP (advance care planning)           Follow-ups after your visit        Follow-up notes from your care team     Return if symptoms worsen or fail to improve.      Who to contact     If you have questions or need follow up information about today's clinic visit or your schedule please contact [facility] directly at [phone].  Normal or non-critical lab and imaging results will be communicated to you by [employer], letter or phone within 4 business days after the clinic has received the results. If you do not hear from us within 7 days, please contact the clinic through [employer] or phone. If you have a critical or abnormal lab result, we will notify you by phone as soon as possible.  Submit refill requests through [employer] or call your pharmacy and they will forward the refill request to us. Please allow 3 business days for your refill to be completed.          Additional Information About Your Visit        MyChart Information     [employer] gives you secure access to your electronic health record. If you see a primary care provider, you can also send messages to your care team and make appointments. If you have questions, please call your primary care clinic.  If you do not have a primary care provider, please call [phone] and they will assist you.        Care EveryWhere ID     This is your Care EveryWhere ID. This could be used by other organizations to access your [place] medical records  [id number]        Your Vitals Were     Pulse Temperature Respirations Height Last Period BMI (Body Mass Index)    68 98.1  F  "(36.7  C) (Oral) 20 1.715 m (5' 7.5\") 04/20/2013 28.83 kg/m2       Blood Pressure from Last 3 Encounters:   10/11/17 132/80   07/31/17 127/86   06/16/17 122/74    Weight from Last 3 Encounters:   10/11/17 84.7 kg (186 lb 12.8 oz)   07/31/17 84.8 kg (187 lb)   06/16/17 84.4 kg (186 lb)              We Performed the Following     BASIC METABOLIC PANEL (QUEST)     VENOUS COLLECTION        Primary Care Provider Office Phone # Fax #    Natalia Ordonez [phone] [address] [address]        Equal Access to Services     [facility] : Hadmichelle sullivano Sokamran, waaxda luqadaha, qaybta kaalmada adeegyada, stephanie glover . So [facility] [phone].    ATENCIÓN: Si habla español, tiene a martinez disposición servicios gratuitos de asistencia lingüística. Llame al [phone].    We comply with applicable federal civil rights laws and [place] laws. We do not discriminate on the basis of race, color, national origin, age, disability, sex, sexual orientation, or gender identity.            Thank you!     Thank you for choosing [facility]  for your care. Our goal is always to provide you with excellent care. Hearing back from our patients is one way we can continue to improve our services. Please take a few minutes to complete the written survey that you may receive in the mail after your visit with us. Thank you!             Your Updated Medication List - Protect others around you: Learn how to safely use, store and throw away your medicines at www.disposemymeds.org.          This list is accurate as of: 10/11/17 11:59 PM.  Always use your most recent med list.                   Brand Name Dispense Instructions for use Diagnosis    lisinopril-hydrochlorothiazide 20-12.5 MG per tablet    PRINZIDE/ZESTORETIC    180 tablet    Take 1-2 tablets by mouth daily    Essential hypertension, benign       metoprolol 50 MG 24 hr tablet    " TOPROL-XL    90 tablet    Take 1 tablet (50 mg) by mouth daily    Essential hypertension, benign, PVC's (premature ventricular contractions)       olopatadine HCl 0.2 % Soln    PATADAY    2.5 mL    Place 1 drop into both eyes daily    Other chronic allergic conjunctivitis       sertraline 100 MG tablet    ZOLOFT    90 tablet    Take 1 tablet (100 mg) by mouth daily    Anxiety

## 2017-10-11 NOTE — NURSING NOTE
Ke Sloan is here for a pre-op exam.  Questioned patient about current smoking habits.  Pt. has never smoked.  PULSE regular  My Chart: active  CLASSIFICATION OF OVERWEIGHT AND OBESITY BY BMI                        Obesity Class           BMI(kg/m2)  Underweight                                    < 18.5  Normal                                         18.5-24.9  Overweight                                     25.0-29.9  OBESITY                     I                  30.0-34.9                             II                 35.0-39.9  EXTREME OBESITY             III                >40                            Patient's  BMI Body mass index is 28.83 kg/(m^2).  http://hin.nhlbi.nih.gov/menuplanner/menu.cgi  Pre-visit planning  Immunizations - up to date  Colonoscopy - is up to date  Mammogram - is due and to be scheduled by patient for later completion  Asthma -   PHQ9 -    BRIAN-7 -

## 2017-10-11 NOTE — PROGRESS NOTES
[facility]  [address]  [address]  [facility] [address]  [phone]  Dept: [phone]    PRE-OP EVALUATION:  Today's date: 10/11/2017    Ke Sloan (: 1960) presents for pre-operative evaluation assessment as requested by Dr. Armstrong.  She requires evaluation and anesthesia risk assessment prior to undergoing surgery/procedure for treatment of eye .  Proposed procedure: Yag Capsulotomy    Date of Surgery/ Procedure: 10/18/17  Time of Surgery/ Procedure:   Hospital/Surgical Facility: [facility] [place]  Fax number for surgical facility: [phone]  Primary Physician: Natalia Ordonez  Type of Anesthesia Anticipated: to be determined    Patient has a Health Care Directive or Living Will:  YES     1. NO - Do you have a history of heart attack, stroke, stent, bypass or surgery on an artery in the head, neck, heart or legs?  2. NO - Do you ever have any pain or discomfort in your chest?  3. NO - Do you have a history of  Heart Failure?  4. NO - Are you troubled by shortness of breath when: walking on the level, up a slight hill or at night?  5. NO - Do you currently have a cold, bronchitis or other respiratory infection?  6. NO - Do you have a cough, shortness of breath or wheezing?  7. NO - Do you sometimes get pains in the calves of your legs when you walk?  8. YES - DO YOU OR ANYONE IN YOUR FAMILY HAVE PREVIOUS HISTORY OF BLOOD CLOTS? Believes mother had DVT- unsure of age, possible triggers  9. NO - Do you or does anyone in your family have a serious bleeding problem such as prolonged bleeding following surgeries or cuts?  10. NO - Have you ever had problems with anemia or been told to take iron pills?  11. NO - Have you had any abnormal blood loss such as black, tarry or bloody stools, or abnormal vaginal bleeding?  12. NO - Have you ever had a blood transfusion?  13. NO - Have you or any of your relatives ever had problems with anesthesia?  14. YES - DO YOU HAVE  SLEEP APNEA, EXCESSIVE SNORING OR DAYTIME DROWSINESS? Was recently diagnosed with borderline TRA. Does not use CPAP  15. NO - Do you have any prosthetic heart valves?  16. NO - Do you have prosthetic joints?  17. NO - Is there any chance that you may be pregnant?        HPI:                                                      Brief HPI related to upcoming procedure: Originally had cataract repair 9/2015. Unfortunately, was found to have small capsule formation in both eyes that need to be repaired with laser.      See problem list for active medical problems.  Problems all longstanding and stable, except as noted/documented.  See ROS for pertinent symptoms related to these conditions.                                                                                                  .    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  Ke Sloan has no plan or code status on file however states presence of ACP document. Copy requested.   Added by Melanie Johnson                     Essential hypertension, benign 01/06/2008     Priority: Medium     Other chronic allergic conjunctivitis 07/21/2002     Priority: Medium      Past Medical History:   Diagnosis Date     MIGRAINE NOS W/O MENTN INTRACTABLE 7/21/2002     Past Surgical History:  "  Procedure Laterality Date     C LIGATE FALLOPIAN TUBE      AGE 32     C NONSPECIFIC PROCEDURE  1998    LASIK EYE SURGERY     COLONOSCOPY  6/2011    Normal/ rpt in 10 yrs     HC PTA RENAL/VISCERAL ARTERY  2008    fibromuscular dysplasia, renal     Current Outpatient Prescriptions   Medication Sig Dispense Refill     metoprolol (TOPROL-XL) 50 MG 24 hr tablet Take 1 tablet (50 mg) by mouth daily 90 tablet 3     lisinopril-hydrochlorothiazide (PRINZIDE/ZESTORETIC) 20-12.5 MG per tablet Take 1-2 tablets by mouth daily 180 tablet 3     sertraline (ZOLOFT) 100 MG tablet Take 1 tablet (100 mg) by mouth daily 90 tablet 1     olopatadine HCl (PATADAY) 0.2 % SOLN Place 1 drop into both eyes daily 2.5 mL 3     OTC products: None, except as noted above    Allergies   Allergen Reactions     Sulfa Drugs       Latex Allergy: NO    Social History   Substance Use Topics     Smoking status: Never Smoker     Smokeless tobacco: Never Used     Alcohol use 0.5 oz/week     1 Standard drinks or equivalent per week      Comment: weekends     History   Drug Use No       REVIEW OF SYSTEMS:                                                    Constitutional, neuro, ENT, endocrine, pulmonary, cardiac, gastrointestinal, genitourinary, musculoskeletal, integument and psychiatric systems are negative, except as otherwise noted.      EXAM:                                                    /80 (BP Location: Left arm, Patient Position: Chair, Cuff Size: Adult Regular)  Pulse 68  Temp 98.1  F (36.7  C) (Oral)  Resp 20  Ht 1.715 m (5' 7.5\")  Wt 84.7 kg (186 lb 12.8 oz)  LMP 04/20/2013  BMI 28.83 kg/m2    GENERAL APPEARANCE: healthy, alert and no distress     EYES: EOMI, PERRL     HENT: ear canals and TM's normal and nose and mouth without ulcers or lesions     NECK: no adenopathy, no asymmetry, masses, or scars and thyroid normal to palpation     RESP: lungs clear to auscultation - no rales, rhonchi or wheezes     CV: regular rates and " rhythm, normal S1 S2, no S3 or S4 and no murmur, click or rub     ABDOMEN:  soft, nontender, no HSM or masses and bowel sounds normal     MS: extremities normal- no gross deformities noted, no evidence of inflammation in joints, FROM in all extremities.     SKIN: no suspicious lesions or rashes     NEURO: Normal strength and tone, sensory exam grossly normal, mentation intact and speech normal     PSYCH: mentation appears normal. and affect normal/bright     LYMPHATICS: No axillary, cervical, or supraclavicular nodes    DIAGNOSTICS:                                                    EKG: Not indicated due to non-vascular surgery and low risk of event (age <65 and without cardiac risk factors)  Serum Potassium     Office Visit on 10/11/2017   Component Date Value Ref Range Status     Glucose 10/11/2017 89  65 - 99 mg/dL Final    Comment:               Fasting reference interval          Urea Nitrogen 10/11/2017 22  7 - 25 mg/dL Final     Creatinine 10/11/2017 0.80  0.50 - 1.05 mg/dL Final    Comment: For patients >49 years of age, the reference limit  for Creatinine is approximately 13% higher for people  identified as -American.          GFR Estimate 10/11/2017 82  > OR = 60 mL/min/1.73m2 Final     EGFR African American 10/11/2017 96  > OR = 60 mL/min/1.73m2 Final     BUN/Creatinine Ratio 10/11/2017 NOT APPLICABLE  6 - 22 (calc) Final     Sodium 10/11/2017 139  135 - 146 mmol/L Final     Potassium 10/11/2017 4.0  3.5 - 5.3 mmol/L Final     Chloride 10/11/2017 101  98 - 110 mmol/L Final     Carbon Dioxide 10/11/2017 28  20 - 31 mmol/L Final     Calcium 10/11/2017 9.8  8.6 - 10.4 mg/dL Final         IMPRESSION:                                                    Reason for surgery/procedure: Yag laser capsulotomy  Diagnosis/reason for consult: Pre-operative exam    The proposed surgical procedure is considered LOW risk.    REVISED CARDIAC RISK INDEX  The patient has the following serious cardiovascular risks for  perioperative complications such as (MI, PE, VFib and 3  AV Block):  No serious cardiac risks  INTERPRETATION: 0 risks: Class I (very low risk - 0.4% complication rate)    The patient has the following additional risks for perioperative complications:  No identified additional risks      ICD-10-CM    1. Pre-op exam Z01.818 VENOUS COLLECTION     BASIC METABOLIC PANEL (QUEST)   2. ACP (advance care planning) Z71.89    3. Benign essential hypertension I10 VENOUS COLLECTION     BASIC METABOLIC PANEL (QUEST)       RECOMMENDATIONS:                                                      --Patient is to take all scheduled medications on the day of surgery EXCEPT for modifications listed below.    APPROVAL GIVEN to proceed with proposed procedure, without further diagnostic evaluation    1. Avoid ibuprofen, ASA from now until surgery.    2. Do not eat anything after midnight  (robert of the surgery) and nothing the morning of the surgery.    3. Medications: Can take at night as normal.    4. Follow all instructions given by the surgery team. They usually give out a packet. Read it and please follow it precisely. This helps surgical experience and outcomes.    5. If you have any questions do not hesitate to call me or the surgeon/surgical team.      Mari Porter PA-C  [facility]           Signed Electronically by: Mari Porter PA-C    Copy of this evaluation report is provided to requesting physician.    [place] Preop Guidelines

## 2017-10-12 LAB
BUN SERPL-MCNC: 22 MG/DL (ref 7–25)
BUN/CREATININE RATIO: NORMAL (CALC) (ref 6–22)
CALCIUM SERPL-MCNC: 9.8 MG/DL (ref 8.6–10.4)
CHLORIDE SERPLBLD-SCNC: 101 MMOL/L (ref 98–110)
CO2 SERPL-SCNC: 28 MMOL/L (ref 20–31)
CREAT SERPL-MCNC: 0.8 MG/DL (ref 0.5–1.05)
EGFR AFRICAN AMERICAN - QUEST: 96 ML/MIN/1.73M2
GFR SERPL CREATININE-BSD FRML MDRD: 82 ML/MIN/1.73M2
GLUCOSE - QUEST: 89 MG/DL (ref 65–99)
POTASSIUM SERPL-SCNC: 4 MMOL/L (ref 3.5–5.3)
SODIUM SERPL-SCNC: 139 MMOL/L (ref 135–146)

## 2017-12-13 ENCOUNTER — TELEPHONE (OUTPATIENT)
Dept: FAMILY MEDICINE | Facility: CLINIC | Age: 57
End: 2017-12-13

## 2017-12-13 DIAGNOSIS — F41.9 ANXIETY: ICD-10-CM

## 2017-12-13 RX ORDER — SERTRALINE HYDROCHLORIDE 100 MG/1
100 TABLET, FILM COATED ORAL DAILY
Qty: 30 TABLET | Refills: 0 | COMMUNITY
Start: 2017-12-13 | End: 2017-12-28

## 2017-12-13 NOTE — TELEPHONE ENCOUNTER
Ok refill of Sertraline for one month only called into [facility]. Pt needs non fasting OV for further refills.     Thanks,Melanie    [phone] (home)

## 2017-12-28 ENCOUNTER — OFFICE VISIT (OUTPATIENT)
Dept: FAMILY MEDICINE | Facility: CLINIC | Age: 57
End: 2017-12-28

## 2017-12-28 VITALS
DIASTOLIC BLOOD PRESSURE: 84 MMHG | WEIGHT: 192.8 LBS | OXYGEN SATURATION: 99 % | TEMPERATURE: 97.6 F | HEART RATE: 79 BPM | SYSTOLIC BLOOD PRESSURE: 122 MMHG | BODY MASS INDEX: 29.22 KG/M2 | HEIGHT: 68 IN

## 2017-12-28 DIAGNOSIS — F41.1 GENERALIZED ANXIETY DISORDER: Primary | ICD-10-CM

## 2017-12-28 PROCEDURE — 99213 OFFICE O/P EST LOW 20 MIN: CPT | Performed by: PHYSICIAN ASSISTANT

## 2017-12-28 RX ORDER — SERTRALINE HYDROCHLORIDE 100 MG/1
100 TABLET, FILM COATED ORAL DAILY
Qty: 90 TABLET | Refills: 3 | Status: SHIPPED | OUTPATIENT
Start: 2017-12-28 | End: 2018-11-30

## 2017-12-28 ASSESSMENT — ANXIETY QUESTIONNAIRES
5. BEING SO RESTLESS THAT IT IS HARD TO SIT STILL: NOT AT ALL
7. FEELING AFRAID AS IF SOMETHING AWFUL MIGHT HAPPEN: NOT AT ALL
2. NOT BEING ABLE TO STOP OR CONTROL WORRYING: NOT AT ALL
IF YOU CHECKED OFF ANY PROBLEMS ON THIS QUESTIONNAIRE, HOW DIFFICULT HAVE THESE PROBLEMS MADE IT FOR YOU TO DO YOUR WORK, TAKE CARE OF THINGS AT HOME, OR GET ALONG WITH OTHER PEOPLE: NOT DIFFICULT AT ALL
1. FEELING NERVOUS, ANXIOUS, OR ON EDGE: SEVERAL DAYS
GAD7 TOTAL SCORE: 1
6. BECOMING EASILY ANNOYED OR IRRITABLE: NOT AT ALL
3. WORRYING TOO MUCH ABOUT DIFFERENT THINGS: NOT AT ALL

## 2017-12-28 ASSESSMENT — PATIENT HEALTH QUESTIONNAIRE - PHQ9
SUM OF ALL RESPONSES TO PHQ QUESTIONS 1-9: 8
5. POOR APPETITE OR OVEREATING: NOT AT ALL

## 2017-12-28 NOTE — MR AVS SNAPSHOT
"              After Visit Summary   12/28/2017    Ke Sloan    MRN: [id number]           Patient Information     Date Of Birth          1960        Visit Information        Provider Department      12/28/2017 6:00 PM Mari Porter PA-C [facility]        Today's Diagnoses     Generalized anxiety disorder    -  1       Follow-ups after your visit        Follow-up notes from your care team     Return in about 1 year (around 12/28/2018) for Routine Visit.      Who to contact     If you have questions or need follow up information about today's clinic visit or your schedule please contact [facility] directly at [phone].  Normal or non-critical lab and imaging results will be communicated to you by MyChart, letter or phone within 4 business days after the clinic has received the results. If you do not hear from us within 7 days, please contact the clinic through [employer] or phone. If you have a critical or abnormal lab result, we will notify you by phone as soon as possible.  Submit refill requests through [employer] or call your pharmacy and they will forward the refill request to us. Please allow 3 business days for your refill to be completed.          Additional Information About Your Visit        MyChart Information     [employer] gives you secure access to your electronic health record. If you see a primary care provider, you can also send messages to your care team and make appointments. If you have questions, please call your primary care clinic.  If you do not have a primary care provider, please call [phone] and they will assist you.        Care EveryWhere ID     This is your Care EveryWhere ID. This could be used by other organizations to access your [place] medical records  [id number]        Your Vitals Were     Pulse Temperature Height Last Period Pulse Oximetry Breastfeeding?    79 97.6  F (36.4  C) (Oral) 1.715 m (5' 7.5\") 04/20/2013 " 99% No    BMI (Body Mass Index)                   29.75 kg/m2            Blood Pressure from Last 3 Encounters:   12/28/17 122/84   10/11/17 132/80   07/31/17 127/86    Weight from Last 3 Encounters:   12/28/17 87.5 kg (192 lb 12.8 oz)   10/11/17 84.7 kg (186 lb 12.8 oz)   07/31/17 84.8 kg (187 lb)              Today, you had the following     No orders found for display         Where to get your medicines      These medications were sent to [place] [address] [place] [facility] [address] [facility]  [address] [facility] [address]    Hours:  Tech issues with their phone system Phone:  [phone]     sertraline 100 MG tablet          Primary Care Provider Office Phone # Fax #    Natalia Ordonez [phone] [address] [address]        Equal Access to Services     MARIKA GALEANA : Hadii aad ku hadasho Soomaali, waaxda luqadaha, qaybta kaalmada adeegyada, waxay izabelin hayesthela glover . So [facility] [phone].    ATENCIÓN: Si habla español, tiene a martinez disposición servicios gratuitos de asistencia lingüística. Llame al [phone].    We comply with applicable federal civil rights laws and [place] laws. We do not discriminate on the basis of race, color, national origin, age, disability, sex, sexual orientation, or gender identity.            Thank you!     Thank you for choosing [facility]  for your care. Our goal is always to provide you with excellent care. Hearing back from our patients is one way we can continue to improve our services. Please take a few minutes to complete the written survey that you may receive in the mail after your visit with us. Thank you!             Your Updated Medication List - Protect others around you: Learn how to safely use, store and throw away your medicines at www.disposemymeds.org.          This list is accurate as of: 12/28/17 11:59 PM.  Always use your most recent med list.                    Brand Name Dispense Instructions for use Diagnosis    lisinopril-hydrochlorothiazide 20-12.5 MG per tablet    PRINZIDE/ZESTORETIC    180 tablet    Take 1-2 tablets by mouth daily    Essential hypertension, benign       metoprolol 50 MG 24 hr tablet    TOPROL-XL    90 tablet    Take 1 tablet (50 mg) by mouth daily    Essential hypertension, benign, PVC's (premature ventricular contractions)       olopatadine HCl 0.2 % Soln    PATADAY    2.5 mL    Place 1 drop into both eyes daily    Other chronic allergic conjunctivitis       sertraline 100 MG tablet    ZOLOFT    90 tablet    Take 1 tablet (100 mg) by mouth daily    Generalized anxiety disorder

## 2017-12-28 NOTE — NURSING NOTE
Ke is here for a med check.    Pre-Visit Screening :  Immunizations : up to date    Colonoscopy : is up to date  Mammogram : is up to date  Asthma Action Test/Plan : na  PHQ9/GAD7 :  Given    Pulse - regular  My Chart - accepts    CLASSIFICATION OF OVERWEIGHT AND OBESITY BY BMI                         Obesity Class           BMI(kg/m2)  Underweight                                    < 18.5  Normal                                         18.5-24.9  Overweight                                     25.0-29.9  OBESITY                     I                  30.0-34.9                              II                 35.0-39.9  EXTREME OBESITY             III                >40                             Patient's  BMI Body mass index is 22.15 kg/(m^2).  http://hin.nhlbi.nih.gov/menuplanner/menu.cgi  Questioned patient about current smoking habits.  Pt. has never smoked.  The patient has verbalized that it is ok to leave a detailed voice message on the patient's cell phone with results/recommendations from this visit.       Verified [phone] phone number:

## 2017-12-28 NOTE — PROGRESS NOTES
"CC: Medication Refill    History:  Ke is here today for refill of sertraline. She has been stable on 100 mg dose since 2015. She denies any side effects, and is happy with the control of her anxiety. She would like to stay on current dose. Last BRIAN-7 was checked 11/2016 and score was 1.     As far as sleep, she does still feel tired during day. Feels like a big part of it is her , who takes up most of the bed, and jars her awake when she snores. Does have borderline TRA, but does not use a device.    PMH, MEDICATIONS, ALLERGIES, SOCIAL AND FAMILY HISTORY in [facility] and reviewed by me personally.      ROS negative other than the symptoms noted above in the HPI.        Examination   /84 (BP Location: Right arm, Patient Position: Chair, Cuff Size: Adult Large)  Pulse 79  Temp 97.6  F (36.4  C) (Oral)  Ht 1.715 m (5' 7.5\")  Wt 87.5 kg (192 lb 12.8 oz)  LMP 04/20/2013  SpO2 99%  Breastfeeding? No  BMI 29.75 kg/m2       Constitutional: Sitting comfortably, in no acute distress. Vital signs noted  Eyes: pupils equal round reactive to light and accomodation, extra ocular movements intact  Cardiovascular:  regular rate and rhythm, no murmurs, clicks, or gallops  Respiratory:  normal respiratory rate and rhythm, lungs clear to auscultation  SKIN: No jaundice/pallor/rash.   Psychiatric: mentation appears normal and affect normal/bright        A/P    ICD-10-CM    1. Generalized anxiety disorder F41.1        DISCUSSION:   Ke is doing very well on sertraline. BRIAN-7 score today is 1. Will send 1 years worth of sertraline 100 mg. She should return at that time to reevaluated or sooner if any concerns.     follow up visit: 1 year    Mari Porter PA-C  [facility]    "

## 2017-12-29 ASSESSMENT — ANXIETY QUESTIONNAIRES: GAD7 TOTAL SCORE: 1

## 2018-04-23 ENCOUNTER — OFFICE VISIT (OUTPATIENT)
Dept: FAMILY MEDICINE | Facility: CLINIC | Age: 58
End: 2018-04-23

## 2018-04-23 VITALS
DIASTOLIC BLOOD PRESSURE: 74 MMHG | OXYGEN SATURATION: 97 % | BODY MASS INDEX: 29.47 KG/M2 | SYSTOLIC BLOOD PRESSURE: 116 MMHG | WEIGHT: 191 LBS | TEMPERATURE: 98.2 F | HEART RATE: 89 BPM

## 2018-04-23 DIAGNOSIS — H10.45 OTHER CHRONIC ALLERGIC CONJUNCTIVITIS OF BOTH EYES: ICD-10-CM

## 2018-04-23 DIAGNOSIS — J31.0 CHRONIC RHINITIS, UNSPECIFIED TYPE: ICD-10-CM

## 2018-04-23 DIAGNOSIS — I49.3 PVC'S (PREMATURE VENTRICULAR CONTRACTIONS): ICD-10-CM

## 2018-04-23 DIAGNOSIS — Z12.31 ENCOUNTER FOR SCREENING MAMMOGRAM FOR BREAST CANCER: ICD-10-CM

## 2018-04-23 DIAGNOSIS — I10 ESSENTIAL HYPERTENSION, BENIGN: ICD-10-CM

## 2018-04-23 PROCEDURE — 99213 OFFICE O/P EST LOW 20 MIN: CPT | Performed by: FAMILY MEDICINE

## 2018-04-23 RX ORDER — FLUTICASONE PROPIONATE 50 MCG
2 SPRAY, SUSPENSION (ML) NASAL DAILY
Qty: 1 BOTTLE | Refills: 11 | Status: SHIPPED | OUTPATIENT
Start: 2018-04-23 | End: 2020-01-06

## 2018-04-23 RX ORDER — METOPROLOL SUCCINATE 50 MG/1
50 TABLET, EXTENDED RELEASE ORAL DAILY
Qty: 90 TABLET | Refills: 1 | Status: SHIPPED | OUTPATIENT
Start: 2018-04-23 | End: 2018-11-30

## 2018-04-23 RX ORDER — OLOPATADINE HYDROCHLORIDE 2 MG/ML
1 SOLUTION/ DROPS OPHTHALMIC DAILY
Qty: 2.5 ML | Refills: 3 | Status: SHIPPED | OUTPATIENT
Start: 2018-04-23 | End: 2020-01-06

## 2018-04-23 RX ORDER — LISINOPRIL AND HYDROCHLOROTHIAZIDE 12.5; 2 MG/1; MG/1
1-2 TABLET ORAL DAILY
Qty: 180 TABLET | Refills: 1 | Status: SHIPPED | OUTPATIENT
Start: 2018-04-23 | End: 2018-11-30

## 2018-04-23 NOTE — PROGRESS NOTES
Hypertension Follow-up      Outpatient blood pressures no recorded blood pressures to review    Low Salt Diet: no added salt      Amount of exercise or physical activity: does not have a daily routine    Problems taking medications regularly: No    Medication side effects: none    Diet: low salt        PROBLEMS TO ADD ON...  Nose has been stuffy for the past four months- no nasal dripping or discharge-  Has difficulty eating, needs to breathe through her mouth  Less ability to smell  Normal taste  Has not tried any medication  She has a history of allergic conjunctivitis- no history of asthma or previous allergic rhinitis      Problem list and histories reviewed & adjusted, as indicated.  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     Health Care Home     Generalized anxiety disorder     TRA (obstructive sleep apnea)     Past Surgical History:   Procedure Laterality Date     C LIGATE FALLOPIAN TUBE      AGE 32     C NONSPECIFIC PROCEDURE  1998    LASIK EYE SURGERY     COLONOSCOPY  6/2011    Normal/ rpt in 10 yrs     HC PTA RENAL/VISCERAL ARTERY  2008    fibromuscular dysplasia, renal       Social History   Substance Use Topics     Smoking status: Never Smoker     Smokeless tobacco: Never Used     Alcohol use 0.5 oz/week     1 Standard drinks or equivalent per week      Comment: weekends     Family History   Problem Relation Age of Onset     Hypertension Mother      DIABETES Mother      Respiratory Mother      emphysema     Anxiety Disorder Mother      Hypertension Father      CEREBROVASCULAR DISEASE Father      60 YEARS OLD WHEN HE HAD A CVA     Sleep Apnea Father      DIABETES Maternal Grandmother      INSULIN     Respiratory Paternal Grandfather      EMPHYSEMA     Thyroid  "Disease Sister      PARTIAL THYROIDECTOMY     CANCER Sister      thyroid     Hypertension Sister      Anxiety Disorder Sister          Current Outpatient Prescriptions   Medication Sig Dispense Refill     fluticasone (FLONASE) 50 MCG/ACT spray Spray 2 sprays into both nostrils daily 1 Bottle 11     lisinopril-hydrochlorothiazide (PRINZIDE/ZESTORETIC) 20-12.5 MG per tablet Take 1-2 tablets by mouth daily 180 tablet 1     metoprolol succinate (TOPROL-XL) 50 MG 24 hr tablet Take 1 tablet (50 mg) by mouth daily 90 tablet 1     olopatadine HCl (PATADAY) 0.2 % SOLN Place 1 drop into both eyes daily 2.5 mL 3     sertraline (ZOLOFT) 100 MG tablet Take 1 tablet (100 mg) by mouth daily 90 tablet 3       Reviewed and updated as needed this visit by clinical staff       Reviewed and updated as needed this visit by Provider         ROS:  CONSTITUTIONAL: NEGATIVE for fever, chills, change in weight  EYE\" seasonal itching- redness  ENT/MOUTH: POSITIVE for nasal stuffiness  RESP: NEGATIVE for significant cough or SOB  CV: NEGATIVE for chest pain, palpitations or peripheral edema    OBJECTIVE:     /74 (BP Location: Right arm, Patient Position: Sitting, Cuff Size: Adult Regular)  Pulse 89  Temp 98.2  F (36.8  C) (Oral)  Wt 86.6 kg (191 lb)  LMP 04/23/2013  SpO2 97%  BMI 29.47 kg/m2  Body mass index is 29.47 kg/(m^2).    Eyes: normal conjunctiva- PERRL  External ears  and canals clear bilaterally. TM's normal bilaterally. Nose normal without lesions or discharge. NO visible polyps Oropharynx normal. Neck supple without palpable adenopathy.  Regular rate and  rhythm. S1 and S2 normal, no murmurs, clicks, gallops or rubs. No edema or JVD. Chest is clear; no wheezes or rales.      Diagnostic Test Results:  none     ASSESSMENT/PLAN:     Problem List Items Addressed This Visit     Other chronic allergic conjunctivitis    Essential hypertension, benign      Other Visit Diagnoses     Encounter for screening mammogram for breast " "cancer    -  Primary    Relevant Orders    Mammo Screening digital (bilat)    PVC's (premature ventricular contractions)               BMI:   Estimated body mass index is 29.47 kg/(m^2) as calculated from the following:    Height as of 12/28/17: 1.715 m (5' 7.5\").    Weight as of this encounter: 86.6 kg (191 lb).   Weight management plan: Discussed healthy diet and exercise guidelines and patient will follow up in 6 months in clinic to re-evaluate.      MEDICATIONS:        - Trial of flonase       - Continue other medications without change  FUTURE APPOINTMENTS:       - Follow-up visit in 6 months  ENT referral if symptoms not improving with flonase    Natalia Ordonez MD  [facility]            "

## 2018-04-23 NOTE — NURSING NOTE
Ke is here to talk about getting back on a medication for her allergies. She states that Allegra and Claritin have not worked well for her in the past.     Pre-visit Screening:  Immunizations:  up to date  Colonoscopy:  is up to date  Mammogram: is due and ordered today  Asthma Action Test/Plan:  No concerns   PHQ9:  NA  GAD7:  NA  Questioned patient about current smoking habits Pt. has never smoked.  Ok to leave detailed message on voice mail for today's visit only Yes, phone # [phone]  (cell)

## 2018-04-23 NOTE — MR AVS SNAPSHOT
"              After Visit Summary   4/23/2018    Ke Sloan    MRN: [id number]           Patient Information     Date Of Birth          1960        Visit Information        Provider Department      4/23/2018 6:00 PM Natalia Ordonez MD [facility]        Today's Diagnoses     Essential hypertension, benign        Chronic rhinitis, unspecified type        PVC's (premature ventricular contractions)        Other chronic allergic conjunctivitis of both eyes        Encounter for screening mammogram for breast cancer           Follow-ups after your visit        Your next 10 appointments already scheduled     Apr 26, 2018  3:50 PM CDT   (Arrive by 3:35 PM)   MA SCREENING BILATERAL W/ LAURA with [id number]   [facility] ([facility])    [address], [address]  [facility] [address] [phone]           Three-dimensional (3D) mammograms are available at [facility] locations in [place], [place], [place], [place], [facility], [place], [place], and [place]. -[facility] locations include [place] and [facility] in [place]. Benefits of 3D mammograms include: - Improved rate of cancer detection - Decreases your chance of having to go back for more tests, which means fewer: - \"False-positive\" results (This means that there is an abnormal area but it isn't cancer.) - Invasive testing procedures, such as a biopsy or surgery - Can provide clearer images of the breast if you have dense breast tissue. 3D mammography is an optional exam that anyone can have with a 2D mammogram. It doesn't replace or take the place of a 2D mammogram. 2D mammograms remain an effective screening test for all women.  Not all insurance companies cover the cost of a 3D mammogram. Check with your insurance.              Future tests that were ordered for you today     Open Future Orders        Priority Expected Expires Ordered    Mammo Screening digital (bilat) " Routine  4/23/2019 4/23/2018            Who to contact     If you have questions or need follow up information about today's clinic visit or your schedule please contact [facility] directly at [phone].  Normal or non-critical lab and imaging results will be communicated to you by IWThart, letter or phone within 4 business days after the clinic has received the results. If you do not hear from us within 7 days, please contact the clinic through IWThart or phone. If you have a critical or abnormal lab result, we will notify you by phone as soon as possible.  Submit refill requests through Medisse or call your pharmacy and they will forward the refill request to us. Please allow 3 business days for your refill to be completed.          Additional Information About Your Visit        IWTharSDNsquare Information     Medisse gives you secure access to your electronic health record. If you see a primary care provider, you can also send messages to your care team and make appointments. If you have questions, please call your primary care clinic.  If you do not have a primary care provider, please call [phone] and they will assist you.        Care EveryWhere ID     This is your Care EveryWhere ID. This could be used by other organizations to access your [facility] medical records  [id number]        Your Vitals Were     Pulse Temperature Last Period Pulse Oximetry BMI (Body Mass Index)       89 98.2  F (36.8  C) (Oral) 04/23/2013 97% 29.47 kg/m2        Blood Pressure from Last 3 Encounters:   04/23/18 116/74   12/28/17 122/84   10/11/17 132/80    Weight from Last 3 Encounters:   04/23/18 86.6 kg (191 lb)   12/28/17 87.5 kg (192 lb 12.8 oz)   10/11/17 84.7 kg (186 lb 12.8 oz)                 Today's Medication Changes          These changes are accurate as of 4/23/18 11:59 PM.  If you have any questions, ask your nurse or doctor.               Start taking these medicines.        Dose/Directions     fluticasone 50 MCG/ACT spray   Commonly known as:  FLONASE   Used for:  Chronic rhinitis, unspecified type   Started by:  Natalia Ordonez MD        Dose:  2 spray   Spray 2 sprays into both nostrils daily   Quantity:  1 Bottle   Refills:  11            Where to get your medicines      These medications were sent to [place] [address] [place] [facility] [address] [facility]  [address] [facility] [address]    Hours:  Tech issues with their phone system Phone:  [phone]     fluticasone 50 MCG/ACT spray    lisinopril-hydrochlorothiazide 20-12.5 MG per tablet    metoprolol succinate 50 MG 24 hr tablet    olopatadine HCl 0.2 % Soln                Primary Care Provider Office Phone # Fax #    Natalia Ordonez [phone] [address] [address]        Equal Access to Services     [facility]: Hadii aad ku hadasho Soomaali, waaxda luqadaha, qaybta kaalmada adeegyada, waxay idiin hayaan clifford glover . So [facility] [phone].    ATENCIÓN: Si habla español, tiene a martinez disposición servicios gratuitos de asistencia lingüística. [facility] [phone].    We comply with applicable federal civil rights laws and [place] laws. We do not discriminate on the basis of race, color, national origin, age, disability, sex, sexual orientation, or gender identity.            Thank you!     Thank you for choosing [facility]  for your care. Our goal is always to provide you with excellent care. Hearing back from our patients is one way we can continue to improve our services. Please take a few minutes to complete the written survey that you may receive in the mail after your visit with us. Thank you!             Your Updated Medication List - Protect others around you: Learn how to safely use, store and throw away your medicines at www.disposemymeds.org.          This list is accurate as of 4/23/18 11:59 PM.  Always use your most recent med  list.                   Brand Name Dispense Instructions for use Diagnosis    fluticasone 50 MCG/ACT spray    FLONASE    1 Bottle    Spray 2 sprays into both nostrils daily    Chronic rhinitis, unspecified type       lisinopril-hydrochlorothiazide 20-12.5 MG per tablet    PRINZIDE/ZESTORETIC    180 tablet    Take 1-2 tablets by mouth daily    Essential hypertension, benign       metoprolol succinate 50 MG 24 hr tablet    TOPROL-XL    90 tablet    Take 1 tablet (50 mg) by mouth daily    Essential hypertension, benign, PVC's (premature ventricular contractions)       olopatadine HCl 0.2 % Soln    PATADAY    2.5 mL    Place 1 drop into both eyes daily    Other chronic allergic conjunctivitis of both eyes       sertraline 100 MG tablet    ZOLOFT    90 tablet    Take 1 tablet (100 mg) by mouth daily    Generalized anxiety disorder

## 2018-04-26 ENCOUNTER — HOSPITAL ENCOUNTER (OUTPATIENT)
Dept: MAMMOGRAPHY | Facility: CLINIC | Age: 58
Discharge: HOME | End: 2018-04-26
Attending: FAMILY MEDICINE | Admitting: FAMILY MEDICINE
Payer: COMMERCIAL

## 2018-04-26 DIAGNOSIS — Z12.31 ENCOUNTER FOR SCREENING MAMMOGRAM FOR BREAST CANCER: ICD-10-CM

## 2018-04-26 PROCEDURE — 77063 BREAST TOMOSYNTHESIS BI: CPT

## 2018-05-21 ENCOUNTER — MYC MEDICAL ADVICE (OUTPATIENT)
Dept: FAMILY MEDICINE | Facility: CLINIC | Age: 58
End: 2018-05-21

## 2018-05-21 NOTE — TELEPHONE ENCOUNTER
From: Ke Sloan  To: Natalia Ordonez MD  Sent: 5/21/2018 10:33 AM CDT  Subject: Question about medications    Allergies are bad. I believe I have taken Zyrtec allergy medicine before but wanted to check and make sure it was okay to take with other medications I currently take.    Also, my eye doctor prescribed Restasis and I am wondering if the eye drops I have been prescribed can be used in conjunction with the Restasis.    Thank you.  Ke

## 2018-08-02 ENCOUNTER — MYC MEDICAL ADVICE (OUTPATIENT)
Dept: FAMILY MEDICINE | Facility: CLINIC | Age: 58
End: 2018-08-02

## 2018-08-02 NOTE — TELEPHONE ENCOUNTER
See my chart message below  Pt was last seen on 4- rtc in six months  Please respond to pt if there is anything you need to let pt know  Sebastian  [phone] (home) [phone] (work)

## 2018-08-02 NOTE — TELEPHONE ENCOUNTER
From: Ke Sloan  To: Natalia Ordonez MD  Sent: 8/2/2018 10:51 AM CDT  Subject: Question about an upcoming visit    I am trying to figure out if I am due for a blood pressure checkup. When I was in a time or two again I believe my meds were checked. Somehow I continue to get refills even though I am not in need of them yet so now I am wondering when I am to be seen again. Thank you!

## 2018-11-30 ENCOUNTER — OFFICE VISIT (OUTPATIENT)
Dept: FAMILY MEDICINE | Facility: CLINIC | Age: 58
End: 2018-11-30

## 2018-11-30 VITALS
HEART RATE: 71 BPM | OXYGEN SATURATION: 95 % | DIASTOLIC BLOOD PRESSURE: 74 MMHG | BODY MASS INDEX: 28.55 KG/M2 | TEMPERATURE: 98.4 F | SYSTOLIC BLOOD PRESSURE: 114 MMHG | WEIGHT: 185 LBS

## 2018-11-30 DIAGNOSIS — F41.1 GENERALIZED ANXIETY DISORDER: ICD-10-CM

## 2018-11-30 DIAGNOSIS — I10 ESSENTIAL HYPERTENSION, BENIGN: ICD-10-CM

## 2018-11-30 DIAGNOSIS — I49.3 PVC'S (PREMATURE VENTRICULAR CONTRACTIONS): ICD-10-CM

## 2018-11-30 LAB
ERYTHROCYTE [DISTWIDTH] IN BLOOD BY AUTOMATED COUNT: 12.7 %
HCT VFR BLD AUTO: 41 % (ref 35–47)
HEMOGLOBIN: 13.6 G/DL (ref 11.7–15.7)
MCH RBC QN AUTO: 30.2 PG (ref 26–33)
MCHC RBC AUTO-ENTMCNC: 33.2 G/DL (ref 31–36)
MCV RBC AUTO: 90.8 FL (ref 78–100)
PLATELET COUNT - QUEST: 209 10^9/L (ref 150–375)
RBC # BLD AUTO: 4.51 10*12/L (ref 3.8–5.2)
WBC # BLD AUTO: 4.4 10*9/L (ref 4–11)

## 2018-11-30 PROCEDURE — 85027 COMPLETE CBC AUTOMATED: CPT | Performed by: FAMILY MEDICINE

## 2018-11-30 PROCEDURE — 80048 BASIC METABOLIC PNL TOTAL CA: CPT | Mod: 90 | Performed by: FAMILY MEDICINE

## 2018-11-30 PROCEDURE — 80061 LIPID PANEL: CPT | Mod: 90 | Performed by: FAMILY MEDICINE

## 2018-11-30 PROCEDURE — 99214 OFFICE O/P EST MOD 30 MIN: CPT | Performed by: FAMILY MEDICINE

## 2018-11-30 PROCEDURE — 36415 COLL VENOUS BLD VENIPUNCTURE: CPT | Performed by: FAMILY MEDICINE

## 2018-11-30 RX ORDER — LISINOPRIL AND HYDROCHLOROTHIAZIDE 12.5; 2 MG/1; MG/1
1-2 TABLET ORAL DAILY
Qty: 180 TABLET | Refills: 1 | Status: SHIPPED | OUTPATIENT
Start: 2018-11-30 | End: 2019-07-03

## 2018-11-30 RX ORDER — METOPROLOL SUCCINATE 50 MG/1
50 TABLET, EXTENDED RELEASE ORAL DAILY
Qty: 90 TABLET | Refills: 1 | Status: SHIPPED | OUTPATIENT
Start: 2018-11-30 | End: 2019-07-03

## 2018-11-30 RX ORDER — SERTRALINE HYDROCHLORIDE 100 MG/1
100 TABLET, FILM COATED ORAL DAILY
Qty: 90 TABLET | Refills: 3 | Status: SHIPPED | OUTPATIENT
Start: 2018-11-30 | End: 2020-01-06

## 2018-11-30 ASSESSMENT — ANXIETY QUESTIONNAIRES

## 2018-11-30 ASSESSMENT — PATIENT HEALTH QUESTIONNAIRE - PHQ9
5. POOR APPETITE OR OVEREATING: NOT AT ALL
SUM OF ALL RESPONSES TO PHQ QUESTIONS 1-9: 3

## 2018-11-30 NOTE — NURSING NOTE
Patient is here for medication check and refills today.    Pre-visit Screening:  Immunizations:  up to date-will get flu shot today  Colonoscopy:  is up to date  Mammogram: is up to date  Asthma Action Test/Plan:  No concerns  PHQ9:  Given today   GAD7:  Given today   Questioned patient about current smoking habits Pt. has never smoked.  Ok to leave detailed message on voice mail for today's visit only Yes, phone # [phone]

## 2018-11-30 NOTE — PROGRESS NOTES
SUBJECTIVE:   Ke Sloan is a 58 year old female who presents to clinic today for the following health issues:    Hypertension Follow-up      Outpatient blood pressures are not being checked.    Low Salt Diet: no added salt    Anxiety Follow-Up    Status since last visit: No change    Other associated symptoms:None    Complicating factors:   Significant life event: marital stress   Current substance abuse: None  Depression symptoms: No- PHQ scores 3  BRIAN-7 SCORE 5/18/2016 11/14/2016 12/28/2017   Total Score - - -   Total Score 0 1 1       BRIAN-7    Amount of exercise or physical activity: not exercising- more tired / walking- recommended    Problems taking medications regularly: No    Medication side effects: none    Diet: eating olives regularly        PROBLEMS TO ADD ON...  Hyperlipidemia- not taking a statin    Problem list and histories reviewed & adjusted, as indicated.  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     Health Care Home     Generalized anxiety disorder     TRA (obstructive sleep apnea)     Past Surgical History:   Procedure Laterality Date     C LIGATE FALLOPIAN TUBE      AGE 32     C NONSPECIFIC PROCEDURE  1998    LASIK EYE SURGERY     COLONOSCOPY  6/2011    Normal/ rpt in 10 yrs     HC PTA RENAL/VISCERAL ARTERY  2008    fibromuscular dysplasia, renal       Social History   Substance Use Topics     Smoking status: Never Smoker     Smokeless tobacco: Never Used     Alcohol use 0.5 oz/week     1 Standard drinks or equivalent per week      Comment: weekends     Family History   Problem Relation Age of Onset     Hypertension Mother      Diabetes Mother      Respiratory Mother      emphysema     Anxiety Disorder Mother      Hypertension Father      Cerebrovascular  Disease Father      60 YEARS OLD WHEN HE HAD A CVA     Sleep Apnea Father      Diabetes Maternal Grandmother      INSULIN     Respiratory Paternal Grandfather      EMPHYSEMA     Thyroid Disease Sister      PARTIAL THYROIDECTOMY     Cancer Sister      thyroid     Hypertension Sister      Anxiety Disorder Sister          Current Outpatient Prescriptions   Medication Sig Dispense Refill     fluticasone (FLONASE) 50 MCG/ACT spray Spray 2 sprays into both nostrils daily 1 Bottle 11     lisinopril-hydrochlorothiazide (PRINZIDE/ZESTORETIC) 20-12.5 MG tablet Take 1-2 tablets by mouth daily 180 tablet 1     metoprolol succinate ER (TOPROL-XL) 50 MG 24 hr tablet Take 1 tablet (50 mg) by mouth daily 90 tablet 1     olopatadine HCl (PATADAY) 0.2 % SOLN Place 1 drop into both eyes daily 2.5 mL 3     sertraline (ZOLOFT) 100 MG tablet Take 1 tablet (100 mg) by mouth daily 90 tablet 3       Reviewed and updated as needed this visit by clinical staff       Reviewed and updated as needed this visit by Provider         ROS:  CONSTITUTIONAL: NEGATIVE for fever, chills, change in weight  ENT/MOUTH: NEGATIVE for ear, mouth and throat problems  RESP: NEGATIVE for significant cough or SOB  CV: NEGATIVE for chest pain, palpitations or peripheral edema  GI: NEGATIVE for nausea, abdominal pain, heartburn, or change in bowel habits  MUSCULOSKELETAL: NEGATIVE for significant arthralgias or myalgia  ENDOCRINE: NEGATIVE for temperature intolerance, skin/hair changes    Situational stress-     OBJECTIVE:     /74 (BP Location: Right arm, Patient Position: Sitting, Cuff Size: Adult Regular)  Pulse 71  Temp 98.4  F (36.9  C) (Oral)  Wt 83.9 kg (185 lb)  LMP 04/23/2013  SpO2 95%  BMI 28.55 kg/m2  Body mass index is 28.55 kg/(m^2).   GENERAL: healthy, alert and no distress  NECK: no adenopathy, no asymmetry, masses, or scars and thyroid normal to palpation  RESP: lungs clear to auscultation - no rales, rhonchi or wheezes  CV: regular rate  and rhythm,  , no murmur, click or rub, no peripheral edema    MS: no gross musculoskeletal defects noted, no edema    Diagnostic Test Results:  Results for orders placed or performed in visit on 11/30/18   BASIC METABOLIC PANEL (QUEST)   Result Value Ref Range    Glucose 87 65 - 99 mg/dL    Urea Nitrogen 24 7 - 25 mg/dL    Creatinine 0.97 0.50 - 1.05 mg/dL    GFR Estimate 65 > OR = 60 mL/min/1.73m2    EGFR African American 75 > OR = 60 mL/min/1.73m2    BUN/Creatinine Ratio NOT APPLICABLE 6 - 22 (calc)    Sodium 139 135 - 146 mmol/L    Potassium 4.2 3.5 - 5.3 mmol/L    Chloride 105 98 - 110 mmol/L    Carbon Dioxide 26 20 - 32 mmol/L    Calcium 9.5 8.6 - 10.4 mg/dL   Lipid Profile   Result Value Ref Range    Cholesterol 206 (H) <200 mg/dL    HDL Cholesterol 45 (L) >50 mg/dL    Triglycerides 121 <150 mg/dL    LDL Cholesterol Calculated 137 (H) mg/dL (calc)    Cholesterol/HDL Ratio 4.6 <5.0 (calc)    Non HDL Cholesterol 161 (H) <130 mg/dL (calc)   HEMOGRAM/PLATELET ([facility])   Result Value Ref Range    WBC 4.4 4.0 - 11 10*9/L    RBC Count 4.51 3.8 - 5.2 10*12/L    Hemoglobin 13.6 11.7 - 15.7 g/dL    Hematocrit 41.0 35.0 - 47.0 %    MCV 90.8 78 - 100 fL    MCH 30.2 26 - 33 pg    MCHC 33.2 31 - 36 g/dL    RDW 12.7 %    Platelet Count 209 150 - 375 10^9/L       ASSESSMENT/PLAN:     Problem List Items Addressed This Visit     Essential hypertension, benign    Relevant Orders    BASIC METABOLIC PANEL (QUEST)    Lipid Profile    VENOUS COLLECTION (Completed)    Generalized anxiety disorder    Relevant Orders    VENOUS COLLECTION (Completed)    HEMOGRAM/PLATELET (BFP) (Completed)      Other Visit Diagnoses     PVC's (premature ventricular contractions)               MEDICATIONS:  Continue current medications without change  FUTURE APPOINTMENTS:       - Follow-up visit in 6 months    Natalia Ordonez MD  [facility]

## 2018-11-30 NOTE — MR AVS SNAPSHOT
After Visit Summary   11/30/2018    Ke Sloan    MRN: [id number]           Patient Information     Date Of Birth          1960        Visit Information        Provider Department      11/30/2018 8:30 AM Natalia Ordonez MD [facility]        Today's Diagnoses     Essential hypertension, benign        PVC's (premature ventricular contractions)        Generalized anxiety disorder           Follow-ups after your visit        Who to contact     If you have questions or need follow up information about today's clinic visit or your schedule please contact [facility] directly at [phone].  Normal or non-critical lab and imaging results will be communicated to you by MyChart, letter or phone within 4 business days after the clinic has received the results. If you do not hear from us within 7 days, please contact the clinic through [employer] or phone. If you have a critical or abnormal lab result, we will notify you by phone as soon as possible.  Submit refill requests through [employer] or call your pharmacy and they will forward the refill request to us. Please allow 3 business days for your refill to be completed.          Additional Information About Your Visit        MyChart Information     [employer] gives you secure access to your electronic health record. If you see a primary care provider, you can also send messages to your care team and make appointments. If you have questions, please call your primary care clinic.  If you do not have a primary care provider, please call [phone] and they will assist you.        Care EveryWhere ID     This is your Care EveryWhere ID. This could be used by other organizations to access your [facility] medical records  [id number]        Your Vitals Were     Pulse Temperature Last Period Pulse Oximetry BMI (Body Mass Index)       71 98.4  F (36.9  C) (Oral) 04/23/2013 95% 28.55 kg/m2        Blood Pressure from  Last 3 Encounters:   11/30/18 114/74   04/23/18 116/74   12/28/17 122/84    Weight from Last 3 Encounters:   11/30/18 83.9 kg (185 lb)   04/23/18 86.6 kg (191 lb)   12/28/17 87.5 kg (192 lb 12.8 oz)              We Performed the Following     BASIC METABOLIC PANEL (QUEST)     HEMOGRAM/PLATELET (BFP)     Lipid Profile     VENOUS COLLECTION          Where to get your medicines      These medications were sent to [place] [id number] [place] [id number] [address]    Hours:  Tech issues with their phone system Phone:  [phone]     lisinopril-hydrochlorothiazide 20-12.5 MG tablet    metoprolol succinate ER 50 MG 24 hr tablet    sertraline 100 MG tablet          Primary Care Provider Office Phone # Fax #    Natalia Ordonez [phone] [address] [address]        Equal Access to Services     [facility]: Hadii pramod ku hadasho Soomaali, waaxda luqadaha, qaybta kaalmada adeegyada, waxay idiin haychayon clifford glover . So [facility] [phone].    ATENCIÓN: Si habla español, tiene a martinez disposición servicios gratuitos de asistencia lingüística. [facility] [phone].    We comply with applicable federal civil rights laws and [place] laws. We do not discriminate on the basis of race, color, national origin, age, disability, sex, sexual orientation, or gender identity.            Thank you!     Thank you for choosing [facility]  for your care. Our goal is always to provide you with excellent care. Hearing back from our patients is one way we can continue to improve our services. Please take a few minutes to complete the written survey that you may receive in the mail after your visit with us. Thank you!             Your Updated Medication List - Protect others around you: Learn how to safely use, store and throw away your medicines at www.disposemymeds.org.          This list is accurate as of  11/30/18 11:59 PM.  Always use your most recent med list.                   Brand Name Dispense Instructions for use Diagnosis    fluticasone 50 MCG/ACT nasal spray    FLONASE    1 Bottle    Spray 2 sprays into both nostrils daily    Chronic rhinitis, unspecified type       lisinopril-hydrochlorothiazide 20-12.5 MG tablet    PRINZIDE/ZESTORETIC    180 tablet    Take 1-2 tablets by mouth daily    Essential hypertension, benign       metoprolol succinate ER 50 MG 24 hr tablet    TOPROL-XL    90 tablet    Take 1 tablet (50 mg) by mouth daily    Essential hypertension, benign, PVC's (premature ventricular contractions)       olopatadine 0.2 % ophthalmic solution    PATADAY    2.5 mL    Place 1 drop into both eyes daily    Other chronic allergic conjunctivitis of both eyes       sertraline 100 MG tablet    ZOLOFT    90 tablet    Take 1 tablet (100 mg) by mouth daily    Generalized anxiety disorder

## 2018-12-01 LAB
BUN SERPL-MCNC: 24 MG/DL (ref 7–25)
BUN/CREATININE RATIO: NORMAL (CALC) (ref 6–22)
CALCIUM SERPL-MCNC: 9.5 MG/DL (ref 8.6–10.4)
CHLORIDE SERPLBLD-SCNC: 105 MMOL/L (ref 98–110)
CHOLEST SERPL-MCNC: 206 MG/DL
CHOLEST/HDLC SERPL: 4.6 (CALC)
CO2 SERPL-SCNC: 26 MMOL/L (ref 20–32)
CREAT SERPL-MCNC: 0.97 MG/DL (ref 0.5–1.05)
EGFR AFRICAN AMERICAN - QUEST: 75 ML/MIN/1.73M2
GFR SERPL CREATININE-BSD FRML MDRD: 65 ML/MIN/1.73M2
GLUCOSE - QUEST: 87 MG/DL (ref 65–99)
HDLC SERPL-MCNC: 45 MG/DL
LDLC SERPL CALC-MCNC: 137 MG/DL (CALC)
NONHDLC SERPL-MCNC: 161 MG/DL (CALC)
POTASSIUM SERPL-SCNC: 4.2 MMOL/L (ref 3.5–5.3)
SODIUM SERPL-SCNC: 139 MMOL/L (ref 135–146)
TRIGL SERPL-MCNC: 121 MG/DL

## 2018-12-01 ASSESSMENT — ANXIETY QUESTIONNAIRES: GAD7 TOTAL SCORE: 0

## 2019-03-09 ENCOUNTER — HEALTH MAINTENANCE LETTER (OUTPATIENT)
Age: 59
End: 2019-03-09

## 2019-07-03 ENCOUNTER — OFFICE VISIT (OUTPATIENT)
Dept: FAMILY MEDICINE | Facility: CLINIC | Age: 59
End: 2019-07-03

## 2019-07-03 VITALS
OXYGEN SATURATION: 96 % | WEIGHT: 195 LBS | RESPIRATION RATE: 20 BRPM | HEART RATE: 75 BPM | BODY MASS INDEX: 30.09 KG/M2 | SYSTOLIC BLOOD PRESSURE: 114 MMHG | DIASTOLIC BLOOD PRESSURE: 86 MMHG

## 2019-07-03 DIAGNOSIS — I10 ESSENTIAL HYPERTENSION, BENIGN: ICD-10-CM

## 2019-07-03 DIAGNOSIS — R53.81 MALAISE: ICD-10-CM

## 2019-07-03 DIAGNOSIS — I49.3 PVC'S (PREMATURE VENTRICULAR CONTRACTIONS): ICD-10-CM

## 2019-07-03 LAB
BUN SERPL-MCNC: 23 MG/DL (ref 7–25)
BUN/CREATININE RATIO: 24.5 (ref 6–22)
CALCIUM SERPL-MCNC: 10 MG/DL (ref 8.6–10.3)
CHLORIDE SERPLBLD-SCNC: 104.4 MMOL/L (ref 98–110)
CO2 SERPL-SCNC: 27.1 MMOL/L (ref 20–32)
CREAT SERPL-MCNC: 0.94 MG/DL (ref 0.7–1.18)
GLUCOSE SERPL-MCNC: 104 MG/DL (ref 60–99)
POTASSIUM SERPL-SCNC: 4.61 MMOL/L (ref 3.5–5.3)
SODIUM SERPL-SCNC: 139.9 MMOL/L (ref 135–146)

## 2019-07-03 PROCEDURE — 36415 COLL VENOUS BLD VENIPUNCTURE: CPT | Performed by: FAMILY MEDICINE

## 2019-07-03 PROCEDURE — 99213 OFFICE O/P EST LOW 20 MIN: CPT | Performed by: FAMILY MEDICINE

## 2019-07-03 PROCEDURE — 84443 ASSAY THYROID STIM HORMONE: CPT | Mod: 90 | Performed by: FAMILY MEDICINE

## 2019-07-03 PROCEDURE — 80048 BASIC METABOLIC PNL TOTAL CA: CPT | Performed by: FAMILY MEDICINE

## 2019-07-03 RX ORDER — DOXYCYCLINE HYCLATE 100 MG
TABLET ORAL
COMMUNITY
Start: 2019-06-21 | End: 2020-01-06

## 2019-07-03 RX ORDER — LISINOPRIL 10 MG/1
10 TABLET ORAL DAILY
Qty: 90 TABLET | Refills: 1 | Status: SHIPPED | OUTPATIENT
Start: 2019-07-03 | End: 2020-01-06

## 2019-07-03 RX ORDER — LIFITEGRAST 50 MG/ML
SOLUTION/ DROPS OPHTHALMIC
COMMUNITY
Start: 2019-06-27 | End: 2020-07-08

## 2019-07-03 RX ORDER — LISINOPRIL AND HYDROCHLOROTHIAZIDE 12.5; 2 MG/1; MG/1
1 TABLET ORAL DAILY
Qty: 90 TABLET | Refills: 3 | Status: SHIPPED | OUTPATIENT
Start: 2019-07-03 | End: 2020-07-08

## 2019-07-03 RX ORDER — METOPROLOL SUCCINATE 50 MG/1
50 TABLET, EXTENDED RELEASE ORAL DAILY
Qty: 90 TABLET | Refills: 3 | Status: SHIPPED | OUTPATIENT
Start: 2019-07-03 | End: 2020-07-08

## 2019-07-03 RX ORDER — OLOPATADINE HYDROCHLORIDE 2 MG/ML
1 SOLUTION/ DROPS OPHTHALMIC DAILY
Qty: 2.5 ML | Refills: 3 | Status: CANCELLED | OUTPATIENT
Start: 2019-07-03

## 2019-07-03 ASSESSMENT — ANXIETY QUESTIONNAIRES
3. WORRYING TOO MUCH ABOUT DIFFERENT THINGS: NOT AT ALL
IF YOU CHECKED OFF ANY PROBLEMS ON THIS QUESTIONNAIRE, HOW DIFFICULT HAVE THESE PROBLEMS MADE IT FOR YOU TO DO YOUR WORK, TAKE CARE OF THINGS AT HOME, OR GET ALONG WITH OTHER PEOPLE: NOT DIFFICULT AT ALL
GAD7 TOTAL SCORE: 0
7. FEELING AFRAID AS IF SOMETHING AWFUL MIGHT HAPPEN: NOT AT ALL
6. BECOMING EASILY ANNOYED OR IRRITABLE: NOT AT ALL
1. FEELING NERVOUS, ANXIOUS, OR ON EDGE: NOT AT ALL
5. BEING SO RESTLESS THAT IT IS HARD TO SIT STILL: NOT AT ALL
2. NOT BEING ABLE TO STOP OR CONTROL WORRYING: NOT AT ALL

## 2019-07-03 ASSESSMENT — PATIENT HEALTH QUESTIONNAIRE - PHQ9
5. POOR APPETITE OR OVEREATING: NOT AT ALL
SUM OF ALL RESPONSES TO PHQ QUESTIONS 1-9: 6

## 2019-07-03 NOTE — PROGRESS NOTES
Subjective     Ke Sloan is a 59 year old female who presents to clinic today for the following health issues:    Seems more tired, gaining weight  Not exercising, too tired  Strong family history of thyroid disease- both parents, siblings  Still working long hours    HPI   Hypertension Follow-up      Do you check your blood pressure regularly outside of the clinic? No     Are you following a low salt diet? No added salt    Amount of exercise or physical activity: None    Problems taking medications regularly: No    Medication side effects: none  Diet: no added salt- processed food once a week, lunch meat on occasion    PROBLEMS TO ADD ON...  History of ectopic beats- she denies palpitations    Patient Active Problem List   Diagnosis     Other chronic allergic conjunctivitis     Essential hypertension, benign     ACP (advance care planning)     Frequent PVCs     Hyperplasia of renal artery/ angioplasty 2008 bilateral renal arteries     Health Care Home     Generalized anxiety disorder     TRA (obstructive sleep apnea)     Past Surgical History:   Procedure Laterality Date     C LIGATE FALLOPIAN TUBE      AGE 32     C NONSPECIFIC PROCEDURE  1998    LASIK EYE SURGERY     COLONOSCOPY  6/2011    Normal/ rpt in 10 yrs     HC PTA RENAL/VISCERAL ARTERY  2008    fibromuscular dysplasia, renal       Social History     Tobacco Use     Smoking status: Never Smoker     Smokeless tobacco: Never Used   Substance Use Topics     Alcohol use: Yes     Alcohol/week: 0.5 oz     Types: 1 Standard drinks or equivalent per week     Comment: weekends     Family History   Problem Relation Age of Onset     Hypertension Mother      Diabetes Mother      Respiratory Mother         emphysema     Anxiety Disorder Mother      Hypertension Father      Cerebrovascular Disease Father         60 YEARS OLD WHEN HE HAD A CVA     Sleep Apnea Father      Diabetes Maternal Grandmother         INSULIN     Respiratory Paternal Grandfather          EMPHYSEMA     Thyroid Disease Sister         PARTIAL THYROIDECTOMY     Cancer Sister         thyroid     Hypertension Sister      Anxiety Disorder Sister          Current Outpatient Medications   Medication Sig Dispense Refill     doxycycline hyclate (VIBRA-TABS) 100 MG tablet        fluticasone (FLONASE) 50 MCG/ACT spray Spray 2 sprays into both nostrils daily 1 Bottle 11     lisinopril (PRINIVIL/ZESTRIL) 10 MG tablet Take 1 tablet (10 mg) by mouth daily In addition to lisinopril 20/12.5 90 tablet 1     lisinopril-hydrochlorothiazide (PRINZIDE/ZESTORETIC) 20-12.5 MG tablet Take 1 tablet by mouth daily 90 tablet 3     metoprolol succinate ER (TOPROL-XL) 50 MG 24 hr tablet Take 1 tablet (50 mg) by mouth daily 90 tablet 3     olopatadine HCl (PATADAY) 0.2 % SOLN Place 1 drop into both eyes daily 2.5 mL 3     sertraline (ZOLOFT) 100 MG tablet Take 1 tablet (100 mg) by mouth daily 90 tablet 3     XIIDRA 5 % opthalmic solution          PROBLEMS TO ADD ON...  Reviewed and updated as needed this visit by Provider    Has been diagnosed with dry eye- does not want patanol refilled at this time-            Review of Systems   ROS COMP:    CONSTITUTIONAL:NEGATIVE for fever, chills,   POSITIVE for weight gain   ENT/MOUTH: NEGATIVE for ear, mouth and throat problems  RESP: NEGATIVE for significant cough or SOB  CV: NEGATIVE for chest pain, palpitations or peripheral edema  GI: NEGATIVE for nausea, abdominal pain, heartburn, or change in bowel habits  MUSCULOSKELETAL: NEGATIVE for significant arthralgias or myalgia  ENDOCRINE: NEGATIVE for temperature intolerance, skin/hair changes  POSITIVE for malaise      Objective    /86 (BP Location: Left arm, Patient Position: Sitting, Cuff Size: Adult Regular)   Pulse 75   Resp 20   Wt 88.5 kg (195 lb)   LMP 04/23/2013   SpO2 96%   BMI 30.09 kg/m    Body mass index is 30.09 kg/m .  Physical Exam   GENERAL: healthy, alert and no distress  NECK: no adenopathy, no asymmetry,  masses, or scars and thyroid normal to palpation  RESP: lungs clear to auscultation - no rales, rhonchi or wheezes  CV: regular rate and rhythm,  , no murmur, click or rub, no peripheral edema    MS: no gross musculoskeletal defects noted, no edema  NEURO: Normal strength and tone, mentation intact and speech normal    Diagnostic Test Results:  Labs reviewed in Epic  Results for orders placed or performed in visit on 07/03/19   Basic Metabolic Panel (BFP)   Result Value Ref Range    Carbon Dioxide 27.1 20 - 32 mmol/L    Creatinine 0.94 0.70 - 1.18 mg/dL    Glucose 104 (A) 60 - 99 mg/dL    Sodium 139.9 135 - 146 mmol/L    Potassium 4.61 3.5 - 5.3 mmol/L    Chloride 104.4 98 - 110 mmol/L    Urea Nitrogen 23 7 - 25 mg/dL    Calcium 10.0 8.6 - 10.3 mg/dL    BUN/Creatinine Ratio 24.5 (A) 6 - 22   TSH with free T4 reflex (QUEST)   Result Value Ref Range    TSH 1.35 0.40 - 4.50 mIU/L           Assessment & Plan   (I10) Essential hypertension, benign  Comment: controlled- continue current medications  Plan: lisinopril-hydrochlorothiazide         (PRINZIDE/ZESTORETIC) 20-12.5 MG tablet,         metoprolol succinate ER (TOPROL-XL) 50 MG 24 hr        tablet, Basic Metabolic Panel (BFP), VENOUS         COLLECTION, lisinopril (PRINIVIL/ZESTRIL) 10 MG        tablet           (I49.3) PVC's (premature ventricular contractions)  Comment: controlled-   Plan: metoprolol succinate ER (TOPROL-XL) 50 MG 24 hr        tablet            (R53.81) Malaise  Comment:   Plan: TSH with free T4 reflex (QUEST)        Call or return to clinic prn if these symtoms worsen, fail to improve as anticipated, or if new symptoms develop.               MEDICATIONS:  Continue current medications without change  FUTURE APPOINTMENTS:       - Follow-up visit in 6 months  Monitor weight  Commit to a regular exercise program to see if this improves her fatigue  Call or return to clinic prn if these symtoms worsen, fail to improve as anticipated, or if new symptoms  develop.        Natalia Ordonez MD  [facility]

## 2019-07-03 NOTE — NURSING NOTE
Chief Complaint   Patient presents with     Recheck Medication     blood pressure medication check and review     Pre-visit Screening:  Immunizations:  up to date  Colonoscopy:  is up to date  Mammogram: is up to date  Asthma Action Test/Plan:  NA  PHQ9:  Given today   GAD7:  Given today   Questioned patient about current smoking habits Pt. has never smoked.  Ok to leave detailed message on voice mail for today's visit only Yes, phone # [phone]

## 2019-07-04 LAB — TSH SERPL-ACNC: 1.35 MIU/L (ref 0.4–4.5)

## 2019-07-04 ASSESSMENT — ANXIETY QUESTIONNAIRES: GAD7 TOTAL SCORE: 0

## 2019-10-01 ENCOUNTER — HEALTH MAINTENANCE LETTER (OUTPATIENT)
Age: 59
End: 2019-10-01

## 2020-01-06 ENCOUNTER — OFFICE VISIT (OUTPATIENT)
Dept: FAMILY MEDICINE | Facility: CLINIC | Age: 60
End: 2020-01-06

## 2020-01-06 VITALS
WEIGHT: 194.2 LBS | HEIGHT: 67 IN | HEART RATE: 71 BPM | DIASTOLIC BLOOD PRESSURE: 84 MMHG | TEMPERATURE: 98 F | OXYGEN SATURATION: 99 % | SYSTOLIC BLOOD PRESSURE: 112 MMHG | BODY MASS INDEX: 30.48 KG/M2

## 2020-01-06 DIAGNOSIS — I10 ESSENTIAL HYPERTENSION, BENIGN: ICD-10-CM

## 2020-01-06 DIAGNOSIS — Z23 NEED FOR VACCINATION: ICD-10-CM

## 2020-01-06 DIAGNOSIS — F41.1 GENERALIZED ANXIETY DISORDER: ICD-10-CM

## 2020-01-06 DIAGNOSIS — H10.45 OTHER CHRONIC ALLERGIC CONJUNCTIVITIS OF BOTH EYES: ICD-10-CM

## 2020-01-06 PROCEDURE — 90686 IIV4 VACC NO PRSV 0.5 ML IM: CPT | Performed by: FAMILY MEDICINE

## 2020-01-06 PROCEDURE — 99213 OFFICE O/P EST LOW 20 MIN: CPT | Mod: 25 | Performed by: FAMILY MEDICINE

## 2020-01-06 PROCEDURE — 90471 IMMUNIZATION ADMIN: CPT | Performed by: FAMILY MEDICINE

## 2020-01-06 RX ORDER — OLOPATADINE HYDROCHLORIDE 2 MG/ML
1 SOLUTION/ DROPS OPHTHALMIC DAILY
Qty: 2.5 ML | Refills: 3 | Status: SHIPPED | OUTPATIENT
Start: 2020-01-06

## 2020-01-06 RX ORDER — SERTRALINE HYDROCHLORIDE 100 MG/1
100 TABLET, FILM COATED ORAL DAILY
Qty: 90 TABLET | Refills: 3 | Status: SHIPPED | OUTPATIENT
Start: 2020-01-06 | End: 2021-01-04

## 2020-01-06 RX ORDER — LISINOPRIL 10 MG/1
10 TABLET ORAL DAILY
Qty: 90 TABLET | Refills: 1 | Status: SHIPPED | OUTPATIENT
Start: 2020-01-06 | End: 2020-07-08

## 2020-01-06 ASSESSMENT — ANXIETY QUESTIONNAIRES
2. NOT BEING ABLE TO STOP OR CONTROL WORRYING: NOT AT ALL
IF YOU CHECKED OFF ANY PROBLEMS ON THIS QUESTIONNAIRE, HOW DIFFICULT HAVE THESE PROBLEMS MADE IT FOR YOU TO DO YOUR WORK, TAKE CARE OF THINGS AT HOME, OR GET ALONG WITH OTHER PEOPLE: NOT DIFFICULT AT ALL
3. WORRYING TOO MUCH ABOUT DIFFERENT THINGS: NOT AT ALL
7. FEELING AFRAID AS IF SOMETHING AWFUL MIGHT HAPPEN: NOT AT ALL
1. FEELING NERVOUS, ANXIOUS, OR ON EDGE: NOT AT ALL
GAD7 TOTAL SCORE: 0
6. BECOMING EASILY ANNOYED OR IRRITABLE: NOT AT ALL
5. BEING SO RESTLESS THAT IT IS HARD TO SIT STILL: NOT AT ALL

## 2020-01-06 ASSESSMENT — PATIENT HEALTH QUESTIONNAIRE - PHQ9
SUM OF ALL RESPONSES TO PHQ QUESTIONS 1-9: 2
5. POOR APPETITE OR OVEREATING: NOT AT ALL

## 2020-01-06 ASSESSMENT — MIFFLIN-ST. JEOR: SCORE: 1493.52

## 2020-01-06 NOTE — NURSING NOTE
Ke is here today for a med recheck.    Pre-visit Screening:  Immunizations:  up to date- will call insurance on shingrix  Colonoscopy:  is up to date  Mammogram: is up to date  Asthma Action Test/Plan:  NA  PHQ9:  Done today  GAD7:  Done today  Questioned patient about current smoking habits Pt. has never smoked.  Ok to leave detailed message on voice mail for today's visit only Yes, phone # [phone]

## 2020-01-06 NOTE — PROGRESS NOTES
Subjective     Ke Sloan is a 59 year old female who presents to clinic today for the following health issues:    HPI   Anxiety Follow-Up: 2015 initiateed    How are you doing with your anxiety since your last visit? Improved      Are you having other symptoms that might be associated with anxiety? Not any more- palpitations have resolved    Have you had a significant life event? OTHER: father's second wife  this past week     Are you feeling depressed? No    Do you have any concerns with your use of alcohol or other drugs? No     Work stress is better-   GAD7 scores 2  PHQ9 scores 0         Social History     Tobacco Use     Smoking status: Never Smoker     Smokeless tobacco: Never Used   Substance Use Topics     Alcohol use: Yes     Alcohol/week: 0.8 standard drinks     Types: 1 Standard drinks or equivalent per week     Comment: weekends     Drug use: No     BRIAN-7 SCORE 2017 2018 7/3/2019   Total Score - - -   Total Score 1 0 0     PHQ 2017 2018 7/3/2019   PHQ-9 Total Score 8 3 6   Q9: Thoughts of better off dead/self-harm past 2 weeks Not at all Not at all Not at all     Last PHQ-9 2020   1.  Little interest or pleasure in doing things 0   2.  Feeling down, depressed, or hopeless 0   3.  Trouble falling or staying asleep, or sleeping too much 1   4.  Feeling tired or having little energy 1   5.  Poor appetite or overeating 0   6.  Feeling bad about yourself 0   7.  Trouble concentrating 0   8.  Moving slowly or restless 0   Q9: Thoughts of better off dead/self-harm past 2 weeks 0   PHQ-9 Total Score 2   Difficulty at work, home, or with people Not difficult at all     BRIAN-7  2020   1. Feeling nervous, anxious, or on edge 0   2. Not being able to stop or control worrying 0   3. Worrying too much about different things 0   4. Trouble relaxing 0   5. Being so restless that it is hard to sit still 0   6. Becoming easily annoyed or irritable 0   7. Feeling afraid, as if  something awful might happen 0   BRIAN-7 Total Score 0   If you checked any problems, how difficult have they made it for you to do your work, take care of things at home, or get along with other people? Not difficult at all         Patient Active Problem List   Diagnosis     Other chronic allergic conjunctivitis     Essential hypertension, benign     ACP (advance care planning)     Frequent PVCs     Hyperplasia of renal artery/ angioplasty 2008 bilateral renal arteries     Health Care Home     Generalized anxiety disorder     TRA (obstructive sleep apnea)     Past Surgical History:   Procedure Laterality Date     C LIGATE FALLOPIAN TUBE      AGE 32     C NONSPECIFIC PROCEDURE  1998    LASIK EYE SURGERY     COLONOSCOPY  6/2011    Normal/ rpt in 10 yrs     HC PTA RENAL/VISCERAL ARTERY  2008    fibromuscular dysplasia, renal       Social History     Tobacco Use     Smoking status: Never Smoker     Smokeless tobacco: Never Used   Substance Use Topics     Alcohol use: Yes     Alcohol/week: 0.8 standard drinks     Types: 1 Standard drinks or equivalent per week     Comment: weekends     Family History   Problem Relation Age of Onset     Hypertension Mother      Diabetes Mother      Respiratory Mother         emphysema     Anxiety Disorder Mother      Hypertension Father      Cerebrovascular Disease Father         60 YEARS OLD WHEN HE HAD A CVA     Sleep Apnea Father      Diabetes Maternal Grandmother         INSULIN     Respiratory Paternal Grandfather         EMPHYSEMA     Thyroid Disease Sister         PARTIAL THYROIDECTOMY     Cancer Sister         thyroid     Hypertension Sister      Anxiety Disorder Sister          Current Outpatient Medications   Medication Sig Dispense Refill     lisinopril (PRINIVIL/ZESTRIL) 10 MG tablet Take 1 tablet (10 mg) by mouth daily In addition to lisinopril 20/12.5 90 tablet 1     lisinopril-hydrochlorothiazide (PRINZIDE/ZESTORETIC) 20-12.5 MG tablet Take 1 tablet by mouth daily 90 tablet  "3     metoprolol succinate ER (TOPROL-XL) 50 MG 24 hr tablet Take 1 tablet (50 mg) by mouth daily 90 tablet 3     olopatadine (PATADAY) 0.2 % ophthalmic solution Place 1 drop into both eyes daily 2.5 mL 3     sertraline (ZOLOFT) 100 MG tablet Take 1 tablet (100 mg) by mouth daily 90 tablet 3     XIIDRA 5 % opthalmic solution        BP Readings from Last 3 Encounters:   01/06/20 112/84   07/03/19 114/86   11/30/18 114/74    Wt Readings from Last 3 Encounters:   01/06/20 88.1 kg (194 lb 3.2 oz)   07/03/19 88.5 kg (195 lb)   11/30/18 83.9 kg (185 lb)                       Reviewed and updated as needed this visit by Provider         Review of Systems   ROS COMP: Constitutional, HEENT, cardiovascular, pulmonary, GI, , musculoskeletal, neuro, skin, endocrine and psych systems are negative, except as otherwise noted.      Objective    /84 (BP Location: Left arm, Patient Position: Sitting, Cuff Size: Adult Large)   Pulse 71   Temp 98  F (36.7  C) (Oral)   Ht 1.702 m (5' 7\")   Wt 88.1 kg (194 lb 3.2 oz)   LMP 04/23/2013   SpO2 99%   BMI 30.42 kg/m    There is no height or weight on file to calculate BMI.  Physical Exam   GENERAL: healthy, alert and no distress  RESP: lungs clear to auscultation - no rales, rhonchi or wheezes  CV: regular rate and rhythm,   no murmur, click or rub, no peripheral edema    PSYCH:ALert and oriented times 3; Coherent speech, normal rate and volume, able to articulate, logical thoughts, able to abstract reason, no tangential thoughts, no hallucinations or delusions.. Affect is pleasant      Diagnostic Test Results:  Labs reviewed in Epic  No results found for any visits on 01/06/20.        Assessment & Plan   (F41.1) Generalized anxiety disorder  Comment: symptoms are in remission  Plan: sertraline (ZOLOFT) 100 MG tablet            (I10) Essential hypertension, benign  Comment: controlled- Lisinopril refilled for six months  Plan: lisinopril (PRINIVIL/ZESTRIL) 10 MG tablet        " "    (H10.45) Other chronic allergic conjunctivitis of both eyes   Comment:   Plan: olopatadine (PATADAY) 0.2 % ophthalmic solution            (Z23) Need for vaccination  Comment:   Plan: HC FLU VAC PRESRV FREE QUAD SPLIT VIR > 6         MONTHS IM                   BMI:   Estimated body mass index is 30.42 kg/m  as calculated from the following:    Height as of this encounter: 1.702 m (5' 7\").    Weight as of this encounter: 88.1 kg (194 lb 3.2 oz).   Weight management plan: Discussed healthy diet and exercise guidelines        FUTURE APPOINTMENTS:       - Follow-up visit in six months  No follow-ups on file.    Natalia Ordonez MD  [facility]        "

## 2020-01-07 ENCOUNTER — MYC MEDICAL ADVICE (OUTPATIENT)
Dept: FAMILY MEDICINE | Facility: CLINIC | Age: 60
End: 2020-01-07

## 2020-01-07 ASSESSMENT — ANXIETY QUESTIONNAIRES: GAD7 TOTAL SCORE: 0

## 2020-01-13 DIAGNOSIS — F41.1 GENERALIZED ANXIETY DISORDER: ICD-10-CM

## 2020-01-13 DIAGNOSIS — I10 ESSENTIAL HYPERTENSION, BENIGN: ICD-10-CM

## 2020-01-13 RX ORDER — SERTRALINE HYDROCHLORIDE 100 MG/1
TABLET, FILM COATED ORAL
Qty: 90 TABLET | Refills: 3 | OUTPATIENT
Start: 2020-01-13

## 2020-01-13 RX ORDER — LISINOPRIL 10 MG/1
10 TABLET ORAL DAILY
Qty: 90 TABLET | Refills: 1 | OUTPATIENT
Start: 2020-01-13

## 2020-01-13 NOTE — TELEPHONE ENCOUNTER
Refused Prescriptions:                       Disp   Refills    lisinopril (PRINIVIL/ZESTRIL) 10 MG tablet*90 tab*1        Sig: TAKE 1 TABLET (10 MG) BY MOUTH DAILY IN ADDITION TO           LISINOPRIL 20/12.5  Refused By: ANGELINA PINO  Reason for Refusal: Request already responded to by other means (phone, fax, etc.)    sertraline (ZOLOFT) 100 MG tablet [Pharmac*90 tab*3        Sig: TAKE 1 TABLET BY MOUTH EVERY DAY  Refused By: ANGELINA PINO  Reason for Refusal: Request already responded to by other means (phone, fax, etc.)    Refilled on 1-6-2020 at [facility] check rtc in six months  Angelina

## 2020-02-26 ENCOUNTER — MYC MEDICAL ADVICE (OUTPATIENT)
Dept: FAMILY MEDICINE | Facility: CLINIC | Age: 60
End: 2020-02-26

## 2020-03-05 ENCOUNTER — ALLIED HEALTH/NURSE VISIT (OUTPATIENT)
Dept: FAMILY MEDICINE | Facility: CLINIC | Age: 60
End: 2020-03-05

## 2020-03-05 DIAGNOSIS — Z23 NEED FOR VACCINATION: Primary | ICD-10-CM

## 2020-03-05 PROCEDURE — 90750 HZV VACC RECOMBINANT IM: CPT | Performed by: FAMILY MEDICINE

## 2020-03-05 PROCEDURE — 90471 IMMUNIZATION ADMIN: CPT | Performed by: FAMILY MEDICINE

## 2020-03-12 ENCOUNTER — MYC MEDICAL ADVICE (OUTPATIENT)
Dept: FAMILY MEDICINE | Facility: CLINIC | Age: 60
End: 2020-03-12

## 2020-03-19 ENCOUNTER — MYC MEDICAL ADVICE (OUTPATIENT)
Dept: FAMILY MEDICINE | Facility: CLINIC | Age: 60
End: 2020-03-19

## 2020-03-19 DIAGNOSIS — R05.9 COUGH: Primary | ICD-10-CM

## 2020-03-19 RX ORDER — OMEPRAZOLE 40 MG/1
40 CAPSULE, DELAYED RELEASE ORAL DAILY
Qty: 30 CAPSULE | Refills: 1 | Status: SHIPPED | OUTPATIENT
Start: 2020-03-19 | End: 2020-09-15

## 2020-07-08 ENCOUNTER — OFFICE VISIT (OUTPATIENT)
Dept: FAMILY MEDICINE | Facility: CLINIC | Age: 60
End: 2020-07-08

## 2020-07-08 VITALS
RESPIRATION RATE: 20 BRPM | TEMPERATURE: 97.7 F | HEART RATE: 65 BPM | DIASTOLIC BLOOD PRESSURE: 82 MMHG | WEIGHT: 200 LBS | SYSTOLIC BLOOD PRESSURE: 122 MMHG | OXYGEN SATURATION: 96 % | BODY MASS INDEX: 31.32 KG/M2

## 2020-07-08 DIAGNOSIS — I10 ESSENTIAL HYPERTENSION, BENIGN: ICD-10-CM

## 2020-07-08 DIAGNOSIS — E78.2 MIXED HYPERLIPIDEMIA: Primary | ICD-10-CM

## 2020-07-08 DIAGNOSIS — I49.3 PVC'S (PREMATURE VENTRICULAR CONTRACTIONS): ICD-10-CM

## 2020-07-08 LAB
BUN SERPL-MCNC: 19 MG/DL (ref 7–25)
BUN/CREATININE RATIO: 18.4 (ref 6–22)
CALCIUM SERPL-MCNC: 9.6 MG/DL (ref 8.6–10.3)
CHLORIDE SERPLBLD-SCNC: 104 MMOL/L (ref 98–110)
CHOLEST SERPL-MCNC: 208 MG/DL (ref 0–199)
CHOLEST/HDLC SERPL: 4 {RATIO} (ref 0–5)
CO2 SERPL-SCNC: 29.8 MMOL/L (ref 20–32)
CREAT SERPL-MCNC: 1.03 MG/DL (ref 0.7–1.18)
GLUCOSE SERPL-MCNC: 102 MG/DL (ref 60–99)
HDLC SERPL-MCNC: 53 MG/DL (ref 40–150)
LDLC SERPL CALC-MCNC: 124 MG/DL (ref 0–130)
POTASSIUM SERPL-SCNC: 4.34 MMOL/L (ref 3.5–5.3)
SODIUM SERPL-SCNC: 141.1 MMOL/L (ref 135–146)
TRIGL SERPL-MCNC: 154 MG/DL (ref 0–149)

## 2020-07-08 PROCEDURE — 90714 TD VACC NO PRESV 7 YRS+ IM: CPT | Performed by: PHYSICIAN ASSISTANT

## 2020-07-08 PROCEDURE — 90471 IMMUNIZATION ADMIN: CPT | Performed by: PHYSICIAN ASSISTANT

## 2020-07-08 PROCEDURE — 36415 COLL VENOUS BLD VENIPUNCTURE: CPT | Performed by: PHYSICIAN ASSISTANT

## 2020-07-08 PROCEDURE — 99213 OFFICE O/P EST LOW 20 MIN: CPT | Mod: 25 | Performed by: PHYSICIAN ASSISTANT

## 2020-07-08 PROCEDURE — 80048 BASIC METABOLIC PNL TOTAL CA: CPT | Performed by: PHYSICIAN ASSISTANT

## 2020-07-08 PROCEDURE — 80061 LIPID PANEL: CPT | Performed by: PHYSICIAN ASSISTANT

## 2020-07-08 RX ORDER — LISINOPRIL 10 MG/1
10 TABLET ORAL DAILY
Qty: 90 TABLET | Refills: 1 | Status: SHIPPED | OUTPATIENT
Start: 2020-07-08 | End: 2021-01-06

## 2020-07-08 RX ORDER — LISINOPRIL AND HYDROCHLOROTHIAZIDE 12.5; 2 MG/1; MG/1
1 TABLET ORAL DAILY
Qty: 90 TABLET | Refills: 1 | Status: SHIPPED | OUTPATIENT
Start: 2020-07-08 | End: 2021-01-06

## 2020-07-08 RX ORDER — METOPROLOL SUCCINATE 50 MG/1
50 TABLET, EXTENDED RELEASE ORAL DAILY
Qty: 90 TABLET | Refills: 1 | Status: SHIPPED | OUTPATIENT
Start: 2020-07-08 | End: 2021-01-04

## 2020-07-08 ASSESSMENT — PATIENT HEALTH QUESTIONNAIRE - PHQ9
SUM OF ALL RESPONSES TO PHQ QUESTIONS 1-9: 2
5. POOR APPETITE OR OVEREATING: NOT AT ALL

## 2020-07-08 ASSESSMENT — ANXIETY QUESTIONNAIRES
6. BECOMING EASILY ANNOYED OR IRRITABLE: NOT AT ALL
3. WORRYING TOO MUCH ABOUT DIFFERENT THINGS: NOT AT ALL
GAD7 TOTAL SCORE: 0
7. FEELING AFRAID AS IF SOMETHING AWFUL MIGHT HAPPEN: NOT AT ALL
1. FEELING NERVOUS, ANXIOUS, OR ON EDGE: NOT AT ALL
2. NOT BEING ABLE TO STOP OR CONTROL WORRYING: NOT AT ALL
IF YOU CHECKED OFF ANY PROBLEMS ON THIS QUESTIONNAIRE, HOW DIFFICULT HAVE THESE PROBLEMS MADE IT FOR YOU TO DO YOUR WORK, TAKE CARE OF THINGS AT HOME, OR GET ALONG WITH OTHER PEOPLE: NOT DIFFICULT AT ALL
5. BEING SO RESTLESS THAT IT IS HARD TO SIT STILL: NOT AT ALL

## 2020-07-08 NOTE — NURSING NOTE
Chief Complaint   Patient presents with     Recheck Medication     medication check and review     Pre-visit Screening:  Immunizations:  Is not up to date-due for tetanus-will do today  Colonoscopy:  is up to date  Mammogram: is due and to be scheduled by patient for later completion  Asthma Action Test/Plan:  NA  PHQ9:  Given today   GAD7:  Given today  Questioned patient about current smoking habits Pt. has never smoked.  Ok to leave detailed message on voice mail for today's visit only Yes, phone # [phone]

## 2020-07-08 NOTE — PROGRESS NOTES
CC: Medication Check    History:  HTN: Takes metoprolol XR 50 mg, lisinopril-hydrochlorothiazide, and lisinopril. Has not been taking BP at home. No chest pain, palpitations, SOB. Has been getting annual eye exams. Is now working from home, and admits she has been eating less healthy and being less active.  Has gained 6 lbs since last appt. Plans to start trying to lose weight.    Anxiety: Taking sertraline 2015 daily. Tolerating this well. No side effects. Wishes to stay on this.     PMH, MEDICATIONS, ALLERGIES, SOCIAL AND FAMILY HISTORY in [facility] and reviewed by me personally.    ROS negative other than the symptoms noted above in the HPI.    Examination   /82 (BP Location: Right arm, Patient Position: Sitting, Cuff Size: Adult Regular)   Pulse 65   Temp 97.7  F (36.5  C) (Oral)   Resp 20   Wt 90.7 kg (200 lb)   LMP 04/23/2013   SpO2 96%   BMI 31.32 kg/m         Constitutional: Sitting comfortably, in no acute distress. Vital signs noted  Eyes: pupils equal round reactive to light and accomodation, extra ocular movements intact  Neck:  no adenopathy, trachea midline and normal to palpation  Cardiovascular:  regular rate and rhythm, no murmurs, clicks, or gallops  Respiratory:  normal respiratory rate and rhythm, lungs clear to auscultation  SKIN: No jaundice/pallor/rash.   Psychiatric: mentation appears normal and affect normal/bright    A/P    ICD-10-CM    1. Mixed hyperlipidemia  E78.2 VENOUS COLLECTION     Lipid Panel (BFP)   2. Essential hypertension, benign  I10 metoprolol succinate ER (TOPROL-XL) 50 MG 24 hr tablet     lisinopril (ZESTRIL) 10 MG tablet     lisinopril-hydrochlorothiazide (ZESTORETIC) 20-12.5 MG tablet     VENOUS COLLECTION     Basic Metabolic Panel (BFP)   3. PVC's (premature ventricular contractions)  I49.3 metoprolol succinate ER (TOPROL-XL) 50 MG 24 hr tablet       DISCUSSION:  Ke is doing well today. BP is borderline goal of <130/80. Recommended check BP at home weekly to  jass. Will recheck fasting labs today, and send MyChart with results when available.    Refilled current medications without change. Encourage pt to work on finding positive ways to change diet choices and activity level with the goal of weight loss and overall better health. Goal weight 180.      follow up visit: 6 months    Mari Porter PA-C  [facility]

## 2020-07-09 ASSESSMENT — ANXIETY QUESTIONNAIRES: GAD7 TOTAL SCORE: 0

## 2020-07-27 ENCOUNTER — ALLIED HEALTH/NURSE VISIT (OUTPATIENT)
Dept: FAMILY MEDICINE | Facility: CLINIC | Age: 60
End: 2020-07-27

## 2020-07-27 DIAGNOSIS — Z23 NEED FOR VACCINATION: Primary | ICD-10-CM

## 2020-07-27 PROCEDURE — 90750 HZV VACC RECOMBINANT IM: CPT | Performed by: PHYSICIAN ASSISTANT

## 2020-07-27 PROCEDURE — 90471 IMMUNIZATION ADMIN: CPT | Performed by: PHYSICIAN ASSISTANT

## 2020-08-31 ENCOUNTER — TRANSFERRED RECORDS (OUTPATIENT)
Dept: FAMILY MEDICINE | Facility: CLINIC | Age: 60
End: 2020-08-31

## 2020-09-15 ENCOUNTER — OFFICE VISIT (OUTPATIENT)
Dept: FAMILY MEDICINE | Facility: CLINIC | Age: 60
End: 2020-09-15

## 2020-09-15 VITALS
HEIGHT: 68 IN | TEMPERATURE: 97.7 F | SYSTOLIC BLOOD PRESSURE: 110 MMHG | HEART RATE: 66 BPM | OXYGEN SATURATION: 96 % | DIASTOLIC BLOOD PRESSURE: 72 MMHG | BODY MASS INDEX: 29.7 KG/M2 | WEIGHT: 196 LBS

## 2020-09-15 DIAGNOSIS — G47.33 OSA (OBSTRUCTIVE SLEEP APNEA): ICD-10-CM

## 2020-09-15 DIAGNOSIS — I10 ESSENTIAL HYPERTENSION, BENIGN: ICD-10-CM

## 2020-09-15 DIAGNOSIS — H33.311 RETINAL TEAR OF RIGHT EYE: ICD-10-CM

## 2020-09-15 DIAGNOSIS — Z01.818 PRE-OPERATIVE EXAMINATION: Primary | ICD-10-CM

## 2020-09-15 LAB
BUN SERPL-MCNC: 38 MG/DL (ref 7–25)
BUN/CREATININE RATIO: 39.2 (ref 6–22)
CALCIUM SERPL-MCNC: 10.3 MG/DL (ref 8.6–10.3)
CHLORIDE SERPLBLD-SCNC: 100.7 MMOL/L (ref 98–110)
CO2 SERPL-SCNC: 28.7 MMOL/L (ref 20–32)
CREAT SERPL-MCNC: 0.97 MG/DL (ref 0.7–1.18)
ERYTHROCYTE [DISTWIDTH] IN BLOOD BY AUTOMATED COUNT: 13.2 %
GLUCOSE SERPL-MCNC: 100 MG/DL (ref 60–99)
HCT VFR BLD AUTO: 46.2 % (ref 35–47)
HEMOGLOBIN: 15 G/DL (ref 11.7–15.7)
MCH RBC QN AUTO: 30.9 PG (ref 26–33)
MCHC RBC AUTO-ENTMCNC: 32.5 G/DL (ref 31–36)
MCV RBC AUTO: 95.2 FL (ref 78–100)
PLATELET COUNT - QUEST: 212 10^9/L (ref 150–375)
POTASSIUM SERPL-SCNC: 5.04 MMOL/L (ref 3.5–5.3)
RBC # BLD AUTO: 4.85 10*12/L (ref 3.8–5.2)
SODIUM SERPL-SCNC: 137.8 MMOL/L (ref 135–146)
WBC # BLD AUTO: 5.2 10*9/L (ref 4–11)

## 2020-09-15 PROCEDURE — 80048 BASIC METABOLIC PNL TOTAL CA: CPT | Performed by: PHYSICIAN ASSISTANT

## 2020-09-15 PROCEDURE — 36415 COLL VENOUS BLD VENIPUNCTURE: CPT | Performed by: PHYSICIAN ASSISTANT

## 2020-09-15 PROCEDURE — 85027 COMPLETE CBC AUTOMATED: CPT | Performed by: PHYSICIAN ASSISTANT

## 2020-09-15 PROCEDURE — 99213 OFFICE O/P EST LOW 20 MIN: CPT | Performed by: PHYSICIAN ASSISTANT

## 2020-09-15 ASSESSMENT — MIFFLIN-ST. JEOR: SCORE: 1504.61

## 2020-09-15 NOTE — LETTER
[facility] PHYSICIANS  [address]  [address]  [facility] [address]  [phone]  Dept: [phone]    PRE-OP EVALUATION:  Today's date: 9/15/2020    Ke Sloan (: 1960) presents for pre-operative evaluation assessment as requested by Dr. Joey Quinn.  She requires evaluation and anesthesia risk assessment prior to undergoing surgery/procedure for treatment of retina detachment.    Proposed Surgery/ Procedure: vitrectomy of right eye  Date of Surgery/ Procedure: 20  Time of Surgery/ Procedure: 8:30 am  Hospital/Surgical Facility: [facility]  Surgery Fax Number: [phone]  Primary Physician: Mari Porter  Type of Anesthesia Anticipated: to be determined    Preoperative Questionnaire:   No - Have you ever had a heart attack or stroke?  No - Have you ever had surgery on your heart or blood vessels, such as a stent, coronary (heart) bypass, or surgery on an artery in the head, neck, heart, or legs?  No - Do you have chest pain when you are physically active?  No - Do you have a history of heart failure?  No - Do you currently have a cold, bronchitis, or symptoms of other respiratory (head and chest) infections?  No - Do you have a cough, shortness of breath, or wheezing?  YES - DO YOU OR ANYONE IN YOUR FAMILY HAVE A HISTORY OF BLOOD CLOTS? Mom had DVT after surgery  No - Do you or anyone in your family have a serious bleeding problem, such as long-lasting bleeding after surgeries or cuts?  YES - HAVE YOU EVERY HAD ANEMIA OR BEEN TOLD TO TAKE IRON PILLS? Surrounding pregnancy. Resolved  No - Have you had any abnormal blood loss such as black, tarry or bloody stools, or abnormal vaginal bleeding?  No - Have you ever had a blood transfusion?  Yes - Are you willing to have a blood transfusion if it is medically needed before, during, or after your surgery?  YES - HAVE YOU OR ANYONE IN YOUR FAMILY EVER HAD PROBLEMS WITH ANESTHESIA (SEDATION FOR  SURGERY)? Sister gets nausea.  No - Do you have sleep apnea, excessive snoring, or daytime drowsiness?   No - Do you have any artifical heart valves or other implanted medical devices, such as a pacemaker, defibrillator, or continuous glucose monitor?  No - Do you have any artifical joints?  No - Are you allergic to latex?  No - Is there any chance that you may be pregnant?    Patient has a Health Care Directive or Living Will:  YES    HPI:     HPI related to upcoming procedure: Ke went normal eye exam in August. Was found to have retinal changes, so referred to retinal specialist. Met with Dr. Quinn 2 weeks ago, who confirmed retinal detachment, so plan is for reattachment.     HYPERTENSION - Patient has longstanding history of HTN , currently denies any symptoms referable to elevated blood pressure. Specifically denies chest pain, palpitations, dyspnea, orthopnea, PND or peripheral edema. Blood pressure readings have been in normal range. Current medication regimen is as listed below. Patient denies any side effects of medication.       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  Ke TONIE Huertajamee has no plan or code status on file however states presence of ACP document. Copy requested.   Added by Melanie Johnson                     Essential hypertension,  benign 01/06/2008     Priority: Medium     Other chronic allergic conjunctivitis 07/21/2002     Priority: Medium      Past Medical History:   Diagnosis Date     MIGRAINE NOS W/O MENTN INTRACTABLE 7/21/2002     Past Surgical History:   Procedure Laterality Date     C LIGATE FALLOPIAN TUBE      AGE 32     C NONSPECIFIC PROCEDURE  1998    LASIK EYE SURGERY     COLONOSCOPY  6/2011    Normal/ rpt in 10 yrs     HC PTA RENAL/VISCERAL ARTERY  2008    fibromuscular dysplasia, renal     Current Outpatient Medications   Medication Sig Dispense Refill     lisinopril (ZESTRIL) 10 MG tablet Take 1 tablet (10 mg) by mouth daily In addition to lisinopril 20/12.5 90 tablet 1     lisinopril-hydrochlorothiazide (ZESTORETIC) 20-12.5 MG tablet Take 1 tablet by mouth daily 90 tablet 1     metoprolol succinate ER (TOPROL-XL) 50 MG 24 hr tablet Take 1 tablet (50 mg) by mouth daily 90 tablet 1     olopatadine (PATADAY) 0.2 % ophthalmic solution Place 1 drop into both eyes daily 2.5 mL 3     sertraline (ZOLOFT) 100 MG tablet Take 1 tablet (100 mg) by mouth daily 90 tablet 3     OTC products: None, except as noted above    Allergies   Allergen Reactions     Sulfa Drugs       Latex Allergy: NO    Social History     Tobacco Use     Smoking status: Never Smoker     Smokeless tobacco: Never Used   Substance Use Topics     Alcohol use: Yes     Alcohol/week: 0.8 standard drinks     Types: 1 Standard drinks or equivalent per week     Comment: weekends     History   Drug Use No       REVIEW OF SYSTEMS:   CONSTITUTIONAL: NEGATIVE for fever, chills, change in weight  INTEGUMENTARY/SKIN: NEGATIVE for worrisome rashes, moles or lesions  EYES: NEGATIVE for vision changes or irritation  ENT/MOUTH: NEGATIVE for ear, mouth and throat problems  RESP: NEGATIVE for significant cough or SOB  BREAST: NEGATIVE for masses, tenderness or discharge  CV: NEGATIVE for chest pain, palpitations or peripheral edema  GI: NEGATIVE for nausea, abdominal pain, heartburn, or  "change in bowel habits  : NEGATIVE for frequency, dysuria, or hematuria  MUSCULOSKELETAL: NEGATIVE for significant arthralgias or myalgia  NEURO: NEGATIVE for weakness, dizziness or paresthesias  ENDOCRINE: NEGATIVE for temperature intolerance, skin/hair changes  HEME: NEGATIVE for bleeding problems  PSYCHIATRIC: NEGATIVE for changes in mood or affect    EXAM:   /72   Pulse 66   Temp 97.7  F (36.5  C) (Oral)   Ht 1.715 m (5' 7.5\")   Wt 88.9 kg (196 lb)   LMP 04/23/2013   SpO2 96%   BMI 30.24 kg/m      GENERAL APPEARANCE: healthy, alert and no distress     EYES: EOMI, PERRL     HENT: ear canals and TM's normal and nose and mouth without ulcers or lesions     NECK: no adenopathy, no asymmetry, masses, or scars and thyroid normal to palpation     RESP: lungs clear to auscultation - no rales, rhonchi or wheezes     CV: regular rates and rhythm, normal S1 S2, no S3 or S4 and no murmur, click or rub     ABDOMEN:  soft, nontender, no HSM or masses and bowel sounds normal     MS: extremities normal- no gross deformities noted, no evidence of inflammation in joints, FROM in all extremities.     SKIN: no suspicious lesions or rashes     NEURO: Normal strength and tone, sensory exam grossly normal, mentation intact and speech normal     PSYCH: mentation appears normal. and affect normal/bright     LYMPHATICS: No cervical adenopathy    DIAGNOSTICS:   No labs or EKG required for low risk surgery (cataract, skin procedure, breast biopsy, etc)  Hemoglobin (indicated for history of anemia or procedure with significant blood loss such as tonsillectomy, major intraperitoneal surgery, vascular surgery, major spine surgery, total joint replacement)  Serum Potassium    Office Visit on 09/15/2020   Component Date Value Ref Range Status     WBC 09/15/2020 5.2  4.0 - 11 10*9/L Final     RBC Count 09/15/2020 4.85  3.8 - 5.2 10*12/L Final     Hemoglobin 09/15/2020 15.0  11.7 - 15.7 g/dL Final     Hematocrit 09/15/2020 46.2  " 35.0 - 47.0 % Final     MCV 09/15/2020 95.2  78 - 100 fL Final     MCH 09/15/2020 30.9  26 - 33 pg Final     MCHC 09/15/2020 32.5  31 - 36 g/dL Final     RDW 09/15/2020 13.2  % Final     Platelet Count 09/15/2020 212  150 - 375 10^9/L Final     Carbon Dioxide 09/15/2020 28.7  20 - 32 mmol/L Final     Creatinine 09/15/2020 0.97  0.70 - 1.18 mg/dL Final     Glucose 09/15/2020 100* 60 - 99 mg/dL Final     Sodium 09/15/2020 137.8  135 - 146 mmol/L Final     Potassium 09/15/2020 5.04  3.5 - 5.3 mmol/L Final     Chloride 09/15/2020 100.7  98 - 110 mmol/L Final     Urea Nitrogen 09/15/2020 38* 7 - 25 mg/dL Final     Calcium 09/15/2020 10.3  8.6 - 10.3 mg/dL Final     BUN/Creatinine Ratio 09/15/2020 39.2* 6 - 22 Final     IMPRESSION:   Reason for surgery/procedure: Retinal detachment  Diagnosis/reason for consult: Pre-operative Examination    The proposed surgical procedure is considered INTERMEDIATE risk.    REVISED CARDIAC RISK INDEX  The patient has the following serious cardiovascular risks for perioperative complications such as (MI, PE, VFib and 3  AV Block):  No serious cardiac risks  INTERPRETATION: 1 risks: Class II (low risk - 0.9% complication rate)    The patient has the following additional risks for perioperative complications:  No identified additional risks      ICD-10-CM    1. Pre-operative examination  Z01.818 VENOUS COLLECTION     HEMOGRAM/PLATELET (BFP)     Basic Metabolic Panel (BFP)   2. Retinal tear of right eye  H33.311    3. Essential hypertension, benign  I10    4. TRA (obstructive sleep apnea)  G47.33        RECOMMENDATIONS:     --Patient is to take all scheduled medications on the day of surgery EXCEPT for modifications listed below.    APPROVAL GIVEN to proceed with proposed procedure, without further diagnostic evaluation    1. Seven days before surgery do not take Aspirin or any over-the-counter pain medications other than Tylenol.  TYLENOL is the safest pain pill to use before surgery because  it does not affect your bleeding time. If tylenol is not sufficient for pain control talk to me or the surgeon and we will decide what is safe to use.    2. Do not eat anything after midnight  (robert of the surgery) and nothing the morning of the surgery.    3. Medications: Pt does not take any daily morning medications.    4. Follow all instructions given by the surgery team. They usually give out a packet. Read it and please follow it precisely. This helps surgical experience and outcomes.    5. If you have any questions do not hesitate to call me or the surgeon/surgical team.    Mari Porter PA-C  [facility]       Signed Electronically by: Mari Porter PA-C    Copy of this evaluation report is provided to requesting physician.    [facility] Preop Guidelines    Revised Cardiac Risk Index

## 2020-09-15 NOTE — PROGRESS NOTES
[facility] PHYSICIANS  [address]  [address]  [facility] [address]  [phone]  Dept: [phone]    PRE-OP EVALUATION:  Today's date: 9/15/2020    Ke Sloan (: 1960) presents for pre-operative evaluation assessment as requested by Dr. Joey Quinn.  She requires evaluation and anesthesia risk assessment prior to undergoing surgery/procedure for treatment of retina detachment.    Proposed Surgery/ Procedure: vitrectomy of right eye  Date of Surgery/ Procedure: 20  Time of Surgery/ Procedure: 8:30 am  Hospital/Surgical Facility: [facility]  Surgery Fax Number: [phone]  Primary Physician: Mari Porter  Type of Anesthesia Anticipated: to be determined    Preoperative Questionnaire:   No - Have you ever had a heart attack or stroke?  No - Have you ever had surgery on your heart or blood vessels, such as a stent, coronary (heart) bypass, or surgery on an artery in the head, neck, heart, or legs?  No - Do you have chest pain when you are physically active?  No - Do you have a history of heart failure?  No - Do you currently have a cold, bronchitis, or symptoms of other respiratory (head and chest) infections?  No - Do you have a cough, shortness of breath, or wheezing?  YES - DO YOU OR ANYONE IN YOUR FAMILY HAVE A HISTORY OF BLOOD CLOTS? Mom had DVT after surgery  No - Do you or anyone in your family have a serious bleeding problem, such as long-lasting bleeding after surgeries or cuts?  YES - HAVE YOU EVERY HAD ANEMIA OR BEEN TOLD TO TAKE IRON PILLS? Surrounding pregnancy. Resolved  No - Have you had any abnormal blood loss such as black, tarry or bloody stools, or abnormal vaginal bleeding?  No - Have you ever had a blood transfusion?  Yes - Are you willing to have a blood transfusion if it is medically needed before, during, or after your surgery?  YES - HAVE YOU OR ANYONE IN YOUR FAMILY EVER HAD PROBLEMS WITH ANESTHESIA (SEDATION FOR  SURGERY)? Sister gets nausea.  No - Do you have sleep apnea, excessive snoring, or daytime drowsiness?   No - Do you have any artifical heart valves or other implanted medical devices, such as a pacemaker, defibrillator, or continuous glucose monitor?  No - Do you have any artifical joints?  No - Are you allergic to latex?  No - Is there any chance that you may be pregnant?    Patient has a Health Care Directive or Living Will:  YES    HPI:     HPI related to upcoming procedure: Ke went normal eye exam in August. Was found to have retinal changes, so referred to retinal specialist. Met with Dr. Quinn 2 weeks ago, who confirmed retinal detachment, so plan is for reattachment.     HYPERTENSION - Patient has longstanding history of HTN , currently denies any symptoms referable to elevated blood pressure. Specifically denies chest pain, palpitations, dyspnea, orthopnea, PND or peripheral edema. Blood pressure readings have been in normal range. Current medication regimen is as listed below. Patient denies any side effects of medication.       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  Ke TONIE Huertajamee has no plan or code status on file however states presence of ACP document. Copy requested.   Added by Melanie Johnson                     Essential hypertension,  benign 01/06/2008     Priority: Medium     Other chronic allergic conjunctivitis 07/21/2002     Priority: Medium      Past Medical History:   Diagnosis Date     MIGRAINE NOS W/O MENTN INTRACTABLE 7/21/2002     Past Surgical History:   Procedure Laterality Date     C LIGATE FALLOPIAN TUBE      AGE 32     C NONSPECIFIC PROCEDURE  1998    LASIK EYE SURGERY     COLONOSCOPY  6/2011    Normal/ rpt in 10 yrs     HC PTA RENAL/VISCERAL ARTERY  2008    fibromuscular dysplasia, renal     Current Outpatient Medications   Medication Sig Dispense Refill     lisinopril (ZESTRIL) 10 MG tablet Take 1 tablet (10 mg) by mouth daily In addition to lisinopril 20/12.5 90 tablet 1     lisinopril-hydrochlorothiazide (ZESTORETIC) 20-12.5 MG tablet Take 1 tablet by mouth daily 90 tablet 1     metoprolol succinate ER (TOPROL-XL) 50 MG 24 hr tablet Take 1 tablet (50 mg) by mouth daily 90 tablet 1     olopatadine (PATADAY) 0.2 % ophthalmic solution Place 1 drop into both eyes daily 2.5 mL 3     sertraline (ZOLOFT) 100 MG tablet Take 1 tablet (100 mg) by mouth daily 90 tablet 3     OTC products: None, except as noted above    Allergies   Allergen Reactions     Sulfa Drugs       Latex Allergy: NO    Social History     Tobacco Use     Smoking status: Never Smoker     Smokeless tobacco: Never Used   Substance Use Topics     Alcohol use: Yes     Alcohol/week: 0.8 standard drinks     Types: 1 Standard drinks or equivalent per week     Comment: weekends     History   Drug Use No       REVIEW OF SYSTEMS:   CONSTITUTIONAL: NEGATIVE for fever, chills, change in weight  INTEGUMENTARY/SKIN: NEGATIVE for worrisome rashes, moles or lesions  EYES: NEGATIVE for vision changes or irritation  ENT/MOUTH: NEGATIVE for ear, mouth and throat problems  RESP: NEGATIVE for significant cough or SOB  BREAST: NEGATIVE for masses, tenderness or discharge  CV: NEGATIVE for chest pain, palpitations or peripheral edema  GI: NEGATIVE for nausea, abdominal pain, heartburn, or  "change in bowel habits  : NEGATIVE for frequency, dysuria, or hematuria  MUSCULOSKELETAL: NEGATIVE for significant arthralgias or myalgia  NEURO: NEGATIVE for weakness, dizziness or paresthesias  ENDOCRINE: NEGATIVE for temperature intolerance, skin/hair changes  HEME: NEGATIVE for bleeding problems  PSYCHIATRIC: NEGATIVE for changes in mood or affect    EXAM:   /72   Pulse 66   Temp 97.7  F (36.5  C) (Oral)   Ht 1.715 m (5' 7.5\")   Wt 88.9 kg (196 lb)   LMP 04/23/2013   SpO2 96%   BMI 30.24 kg/m      GENERAL APPEARANCE: healthy, alert and no distress     EYES: EOMI, PERRL     HENT: ear canals and TM's normal and nose and mouth without ulcers or lesions     NECK: no adenopathy, no asymmetry, masses, or scars and thyroid normal to palpation     RESP: lungs clear to auscultation - no rales, rhonchi or wheezes     CV: regular rates and rhythm, normal S1 S2, no S3 or S4 and no murmur, click or rub     ABDOMEN:  soft, nontender, no HSM or masses and bowel sounds normal     MS: extremities normal- no gross deformities noted, no evidence of inflammation in joints, FROM in all extremities.     SKIN: no suspicious lesions or rashes     NEURO: Normal strength and tone, sensory exam grossly normal, mentation intact and speech normal     PSYCH: mentation appears normal. and affect normal/bright     LYMPHATICS: No cervical adenopathy    DIAGNOSTICS:   No labs or EKG required for low risk surgery (cataract, skin procedure, breast biopsy, etc)  Hemoglobin (indicated for history of anemia or procedure with significant blood loss such as tonsillectomy, major intraperitoneal surgery, vascular surgery, major spine surgery, total joint replacement)  Serum Potassium    Office Visit on 09/15/2020   Component Date Value Ref Range Status     WBC 09/15/2020 5.2  4.0 - 11 10*9/L Final     RBC Count 09/15/2020 4.85  3.8 - 5.2 10*12/L Final     Hemoglobin 09/15/2020 15.0  11.7 - 15.7 g/dL Final     Hematocrit 09/15/2020 46.2  " 35.0 - 47.0 % Final     MCV 09/15/2020 95.2  78 - 100 fL Final     MCH 09/15/2020 30.9  26 - 33 pg Final     MCHC 09/15/2020 32.5  31 - 36 g/dL Final     RDW 09/15/2020 13.2  % Final     Platelet Count 09/15/2020 212  150 - 375 10^9/L Final     Carbon Dioxide 09/15/2020 28.7  20 - 32 mmol/L Final     Creatinine 09/15/2020 0.97  0.70 - 1.18 mg/dL Final     Glucose 09/15/2020 100* 60 - 99 mg/dL Final     Sodium 09/15/2020 137.8  135 - 146 mmol/L Final     Potassium 09/15/2020 5.04  3.5 - 5.3 mmol/L Final     Chloride 09/15/2020 100.7  98 - 110 mmol/L Final     Urea Nitrogen 09/15/2020 38* 7 - 25 mg/dL Final     Calcium 09/15/2020 10.3  8.6 - 10.3 mg/dL Final     BUN/Creatinine Ratio 09/15/2020 39.2* 6 - 22 Final     IMPRESSION:   Reason for surgery/procedure: Retinal detachment  Diagnosis/reason for consult: Pre-operative Examination    The proposed surgical procedure is considered INTERMEDIATE risk.    REVISED CARDIAC RISK INDEX  The patient has the following serious cardiovascular risks for perioperative complications such as (MI, PE, VFib and 3  AV Block):  No serious cardiac risks  INTERPRETATION: 1 risks: Class II (low risk - 0.9% complication rate)    The patient has the following additional risks for perioperative complications:  No identified additional risks      ICD-10-CM    1. Pre-operative examination  Z01.818 VENOUS COLLECTION     HEMOGRAM/PLATELET (BFP)     Basic Metabolic Panel (BFP)   2. Retinal tear of right eye  H33.311    3. Essential hypertension, benign  I10    4. TRA (obstructive sleep apnea)  G47.33        RECOMMENDATIONS:     --Patient is to take all scheduled medications on the day of surgery EXCEPT for modifications listed below.    APPROVAL GIVEN to proceed with proposed procedure, without further diagnostic evaluation    1. Seven days before surgery do not take Aspirin or any over-the-counter pain medications other than Tylenol.  TYLENOL is the safest pain pill to use before surgery because  it does not affect your bleeding time. If tylenol is not sufficient for pain control talk to me or the surgeon and we will decide what is safe to use.    2. Do not eat anything after midnight  (robert of the surgery) and nothing the morning of the surgery.    3. Medications: Pt does not take any daily morning medications.    4. Follow all instructions given by the surgery team. They usually give out a packet. Read it and please follow it precisely. This helps surgical experience and outcomes.    5. If you have any questions do not hesitate to call me or the surgeon/surgical team.    Mari Porter PA-C  [facility]       Signed Electronically by: Mari Porter PA-C    Copy of this evaluation report is provided to requesting physician.    [facility] Preop Guidelines    Revised Cardiac Risk Index

## 2020-10-05 ENCOUNTER — MYC MEDICAL ADVICE (OUTPATIENT)
Dept: FAMILY MEDICINE | Facility: CLINIC | Age: 60
End: 2020-10-05

## 2020-11-01 PROBLEM — H33.8 CHRONIC PARTIAL RETINAL DETACHMENT: Status: ACTIVE | Noted: 2020-11-01

## 2020-11-09 ENCOUNTER — MYC MEDICAL ADVICE (OUTPATIENT)
Dept: FAMILY MEDICINE | Facility: CLINIC | Age: 60
End: 2020-11-09

## 2020-11-12 ENCOUNTER — MYC MEDICAL ADVICE (OUTPATIENT)
Dept: FAMILY MEDICINE | Facility: CLINIC | Age: 60
End: 2020-11-12

## 2020-12-08 ENCOUNTER — OFFICE VISIT (OUTPATIENT)
Dept: FAMILY MEDICINE | Facility: CLINIC | Age: 60
End: 2020-12-08

## 2020-12-08 ENCOUNTER — MYC MEDICAL ADVICE (OUTPATIENT)
Dept: FAMILY MEDICINE | Facility: CLINIC | Age: 60
End: 2020-12-08

## 2020-12-08 VITALS
SYSTOLIC BLOOD PRESSURE: 108 MMHG | HEIGHT: 68 IN | BODY MASS INDEX: 28.04 KG/M2 | TEMPERATURE: 98.8 F | DIASTOLIC BLOOD PRESSURE: 70 MMHG | HEART RATE: 66 BPM | OXYGEN SATURATION: 97 % | WEIGHT: 185 LBS

## 2020-12-08 DIAGNOSIS — H91.92 CHANGE IN HEARING OF LEFT EAR: ICD-10-CM

## 2020-12-08 DIAGNOSIS — H73.20 EARDRUM INFLAMMATION: ICD-10-CM

## 2020-12-08 DIAGNOSIS — I10 ESSENTIAL HYPERTENSION, BENIGN: ICD-10-CM

## 2020-12-08 DIAGNOSIS — I49.3 PVC'S (PREMATURE VENTRICULAR CONTRACTIONS): ICD-10-CM

## 2020-12-08 DIAGNOSIS — Z00.00 ENCOUNTER FOR GENERAL MEDICAL EXAMINATION: Primary | ICD-10-CM

## 2020-12-08 DIAGNOSIS — Z13.220 SCREENING FOR LIPID DISORDERS: ICD-10-CM

## 2020-12-08 LAB
BUN SERPL-MCNC: 28 MG/DL (ref 7–25)
BUN/CREATININE RATIO: 29.5 (ref 6–22)
CALCIUM SERPL-MCNC: 9.8 MG/DL (ref 8.6–10.3)
CHLORIDE SERPLBLD-SCNC: 103.9 MMOL/L (ref 98–110)
CHOLEST SERPL-MCNC: 219 MG/DL (ref 0–199)
CHOLEST/HDLC SERPL: 4 {RATIO} (ref 0–5)
CO2 SERPL-SCNC: 28.4 MMOL/L (ref 20–32)
CREAT SERPL-MCNC: 0.95 MG/DL (ref 0.7–1.18)
GLUCOSE SERPL-MCNC: 99 MG/DL (ref 60–99)
HDLC SERPL-MCNC: 53 MG/DL (ref 40–150)
LDLC SERPL CALC-MCNC: 146 MG/DL (ref 0–130)
POTASSIUM SERPL-SCNC: 4.36 MMOL/L (ref 3.5–5.3)
SODIUM SERPL-SCNC: 141.4 MMOL/L (ref 135–146)
TRIGL SERPL-MCNC: 99 MG/DL (ref 0–149)

## 2020-12-08 PROCEDURE — 36415 COLL VENOUS BLD VENIPUNCTURE: CPT | Performed by: PHYSICIAN ASSISTANT

## 2020-12-08 PROCEDURE — 80061 LIPID PANEL: CPT | Performed by: PHYSICIAN ASSISTANT

## 2020-12-08 PROCEDURE — 80048 BASIC METABOLIC PNL TOTAL CA: CPT | Performed by: PHYSICIAN ASSISTANT

## 2020-12-08 PROCEDURE — 90686 IIV4 VACC NO PRSV 0.5 ML IM: CPT | Performed by: PHYSICIAN ASSISTANT

## 2020-12-08 PROCEDURE — 90471 IMMUNIZATION ADMIN: CPT | Performed by: PHYSICIAN ASSISTANT

## 2020-12-08 PROCEDURE — 99396 PREV VISIT EST AGE 40-64: CPT | Mod: 25 | Performed by: PHYSICIAN ASSISTANT

## 2020-12-08 RX ORDER — LISINOPRIL AND HYDROCHLOROTHIAZIDE 12.5; 2 MG/1; MG/1
1 TABLET ORAL DAILY
Qty: 90 TABLET | Refills: 1 | Status: CANCELLED | OUTPATIENT
Start: 2020-12-08

## 2020-12-08 RX ORDER — LISINOPRIL 10 MG/1
10 TABLET ORAL DAILY
Qty: 90 TABLET | Refills: 1 | Status: CANCELLED | OUTPATIENT
Start: 2020-12-08

## 2020-12-08 RX ORDER — METOPROLOL SUCCINATE 50 MG/1
50 TABLET, EXTENDED RELEASE ORAL DAILY
Qty: 90 TABLET | Refills: 1 | Status: CANCELLED | OUTPATIENT
Start: 2020-12-08

## 2020-12-08 SDOH — HEALTH STABILITY: MENTAL HEALTH: HOW MANY STANDARD DRINKS CONTAINING ALCOHOL DO YOU HAVE ON A TYPICAL DAY?: 3 OR 4

## 2020-12-08 SDOH — HEALTH STABILITY: MENTAL HEALTH: HOW OFTEN DO YOU HAVE A DRINK CONTAINING ALCOHOL?: 2-4 TIMES A MONTH

## 2020-12-08 SDOH — HEALTH STABILITY: MENTAL HEALTH: HOW OFTEN DO YOU HAVE 6 OR MORE DRINKS ON ONE OCCASION?: NOT ASKED

## 2020-12-08 ASSESSMENT — MIFFLIN-ST. JEOR: SCORE: 1454.71

## 2020-12-08 NOTE — NURSING NOTE
Ke is here for a fasting CPX, PAP due next year.        Pre-visit Screening:  Immunizations:  up to date  Colonoscopy:  is up to date  Mammogram: is due and to be scheduled by patient for later completion  Asthma Action Test/Plan:  NA  PHQ9:  UTD  GAD7:  UTD  Questioned patient about current smoking habits Pt. has never smoked.  Ok to leave detailed message on voice mail for today's visit only Yes, phone # mobile

## 2020-12-08 NOTE — PROGRESS NOTES
Chief Complaint:  Physical Exam    SUBJECTIVE:   Ke Sloan is a 60 year old female presents for routine health maintenance.    Current concerns: Due for medication check in January. Okay to return. Will do fasting labs today, and then wouldn't need to fast.    Still having some hearing change in left ear after diagnosed with AOM and external ear infection 11/7/2020. Treated with 10 day course of amoxicillin.      Menses are absent    Patient's last menstrual period was 04/23/2013.     Was last Pap smear normal: Yes, due to repeat 12/2021  Due for mammogram:  Yes    Body mass index is 28.55 kg/m .    Present exercise habits:  Nothing formal  Present dietary habits:  eats regular meals and follows a balanced nutrition diet    Calcium intake: 1-2 servings daily  Vit D intake: is not taking supplement    Is the patient a smoker? No  If yes, smoking cessation advised and counseling provided.     Cardiovascular risk factors: hypertension    Over the past few weeks, have you felt down or depressed? Little interest or pleasure in doing things? No concerns    If in a relationship are there any Domestic violence concern: No    Last dental appointment:  last year  Last optical appointment:  this year    Was the patient born between [id number] and has not had Hep C testing?  Patient has already been tested    I have reviewed the following histories: Past Medical History, Past Surgical History, Social History, Family History, Problem List, Medication List and Allergies    Past Medical History:   Diagnosis Date     MIGRAINE NOS W/O MENTN INTRACTABLE 7/21/2002     Family History   Problem Relation Age of Onset     Hypertension Mother      Diabetes Mother      Respiratory Mother         emphysema     Anxiety Disorder Mother      Hypertension Father      Cerebrovascular Disease Father         60 YEARS OLD WHEN HE HAD A CVA     Sleep Apnea Father      Diabetes Father      Diabetes Maternal Grandmother         INSULIN      Respiratory Paternal Grandfather         EMPHYSEMA     Thyroid Disease Sister      Diabetes Sister      Thyroid Cancer Sister         partial thyroidectomy     Hypertension Sister      Anxiety Disorder Sister      Aortic Valve Replacement Daughter         aortic valve stenosis, bicuspid     Social History     Socioeconomic History     Marital status:      Spouse name: Not on file     Number of children: Not on file     Years of education: Not on file     Highest education level: Not on file   Occupational History     Not on file   Social Needs     Financial resource strain: Not on file     Food insecurity     Worry: Not on file     Inability: Not on file     Transportation needs     Medical: Not on file     Non-medical: Not on file   Tobacco Use     Smoking status: Never Smoker     Smokeless tobacco: Never Used   Substance and Sexual Activity     Alcohol use: Yes     Alcohol/week: 0.8 standard drinks     Types: 1 Standard drinks or equivalent per week     Frequency: 2-4 times a month     Drinks per session: 3 or 4     Comment: weekends     Drug use: No     Sexual activity: Yes     Partners: Male   Lifestyle     Physical activity     Days per week: Not on file     Minutes per session: Not on file     Stress: Not on file   Relationships     Social connections     Talks on phone: Not on file     Gets together: Not on file     Attends Voodoo service: Not on file     Active member of club or organization: Not on file     Attends meetings of clubs or organizations: Not on file     Relationship status: Not on file     Intimate partner violence     Fear of current or ex partner: Not on file     Emotionally abused: Not on file     Physically abused: Not on file     Forced sexual activity: Not on file   Other Topics Concern     Not on file   Social History Narrative     Not on file     Past Surgical History:   Procedure Laterality Date     C LIGATE FALLOPIAN TUBE      AGE 32     COLONOSCOPY  06/01/2011    Normal/  rpt in 10 yrs     HC PTA RENAL/VISCERAL ARTERY  01/01/2008    fibromuscular dysplasia, renal     RETINAL REATTACHMENT Right 09/2020     ZZC NONSPECIFIC PROCEDURE  01/01/1998    LASIK EYE SURGERY       ROS:  E/M: NEGATIVE for ear, nose, mouth and throat problems  R: NEGATIVE for significant/chronic cough or SOB  CV: NEGATIVE for chest pain or palpitations  GI: NEGATIVE for abdominal pain, chronic diarrhea or constipation  :  NEGATIVE for dysuria, hematuria or vaginal discharge. No sexual health concerns.       Current Outpatient Medications   Medication     lisinopril (ZESTRIL) 10 MG tablet     lisinopril-hydrochlorothiazide (ZESTORETIC) 20-12.5 MG tablet     metoprolol succinate ER (TOPROL-XL) 50 MG 24 hr tablet     sertraline (ZOLOFT) 100 MG tablet     olopatadine (PATADAY) 0.2 % ophthalmic solution     No current facility-administered medications for this visit.        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  Ke Sloan has no plan or code status on file however states presence of ACP document. Copy requested.   Added by Melanie Johnson                     Essential hypertension, benign 01/06/2008     Priority: Medium     Other chronic allergic conjunctivitis 07/21/2002     Priority: Medium  "      OBJECTIVE:  /70   Pulse 66   Temp 98.8  F (37.1  C) (Temporal)   Ht 1.715 m (5' 7.5\")   Wt 83.9 kg (185 lb)   LMP 04/23/2013   SpO2 97%   BMI 28.55 kg/m      General: 60 year old female who appears her stated age. Vital signs noted  Head: Normocephalic  Eyes: pupils equal round reactive to light and accomodation, extra ocular movements intact  Ears: external canals and TMs free of abnormalities other than mild inflammation and erythema of left TM.  Nose: patent, without mucosal abnormalities  Mouth and throat: without erythema or lesions of the mucosa  Neck: supple, without adenopathy or thyromegaly  Lungs: clear to auscultation, no wheezing or crackles  Breasts: skin without rash, no dominant mass, no nipple discharge, or axillary adenopathy  Cv: regular rate and rhythm, normal s1 and s2 without murmur or click  Abd: soft, non-tender, no masses, no hepatomegaly or splenomegaly.   (female): Declined will do next year with pap.  Ms: normal muscle tone & symmetry  Skin: clear to inspection and with no palpable abnormalities.  Neuro: sensation and motor function grossly intact; cranial nerves without obvious abnormalities.    ASSESSMENT/PLAN:    1. Encounter for general medical examination  Ke is doing well. Will update fasting labs and send MyChart with results.     2. Essential hypertension, benign  3. PVC's (premature ventricular contractions)  Given weight loss, very good control of BP, advised stop lisinopril 10 mg, and return as scheduled in January for medication recheck. Continue lifestyle improvements.   - VENOUS COLLECTION  - Basic Metabolic Panel (BFP)      4. Screening for lipid disorders  - VENOUS COLLECTION  - Lipid Panel (BFP)    5. Change in hearing  6. Ear drum inflammation  Referred to ENT, which is scheduled for this Thursday   reports that she has never smoked. She has never used smokeless tobacco.      Estimated body mass index is 28.55 kg/m  as calculated from the " "following:    Height as of this encounter: 1.715 m (5' 7.5\").    Weight as of this encounter: 83.9 kg (185 lb).        Labs pending:      Fasting glucose      Fasting lipids  Meds Suggested:      Vitamin D       Calcium  Tests Recommended:      Regular Dental Examinations        Eye exam  Behavior Modifications:       Cardiovascular exercise 3 times per week--enough to get your Target Heart rate  Immunizations suggested:       Influenza   Yearly  Other recommendations:     BMI noted and discussed      Regular breast exam     Encouraged My Chart    Counseling Resources:  ATP IV Guidelines  Pooled Cohorts Equation Calculator  Breast Cancer Risk Calculator  FRAX Risk Assessment  ICSI Preventive Guidelines  Dietary Guidelines for Americans, 2010  USDA's MyPlate            Mari Porter PA-C  12/8/2020    "

## 2020-12-09 ENCOUNTER — TRANSFERRED RECORDS (OUTPATIENT)
Dept: FAMILY MEDICINE | Facility: CLINIC | Age: 60
End: 2020-12-09

## 2020-12-31 DIAGNOSIS — I10 ESSENTIAL HYPERTENSION, BENIGN: ICD-10-CM

## 2020-12-31 RX ORDER — LISINOPRIL 10 MG/1
10 TABLET ORAL DAILY
Qty: 90 TABLET | Refills: 1 | Status: CANCELLED | OUTPATIENT
Start: 2020-12-31

## 2020-12-31 RX ORDER — LISINOPRIL AND HYDROCHLOROTHIAZIDE 12.5; 2 MG/1; MG/1
1 TABLET ORAL DAILY
Qty: 90 TABLET | Refills: 1 | Status: CANCELLED | OUTPATIENT
Start: 2020-12-31

## 2020-12-31 NOTE — TELEPHONE ENCOUNTER
"From 12/8/2020 physical: \"Due for medication check in January. Okay to return. Will do fasting labs today, and then wouldn't need to fast.\"    I would be willing to do short term refill if need once that appt is scheduled.   "

## 2020-12-31 NOTE — TELEPHONE ENCOUNTER
Received refill for lisinopril and lisinopril-hydrochlorothiazide. Last med recheck 07/08/20, pt had CPX on 12/08/20. Please advise is pt due for OV, she will be out of her Sertraline soon as well.     Ke Sloan is requesting a refill of:    Pending Prescriptions:                       Disp   Refills    lisinopril-hydrochlorothiazide (ZESTORETI*90 tab*1            Sig: Take 1 tablet by mouth daily    lisinopril (ZESTRIL) 10 MG tablet         90 tab*1            Sig: Take 1 tablet (10 mg) by mouth daily In addition           to lisinopril 20/12.5

## 2021-01-04 ENCOUNTER — MYC MEDICAL ADVICE (OUTPATIENT)
Dept: FAMILY MEDICINE | Facility: CLINIC | Age: 61
End: 2021-01-04

## 2021-01-04 DIAGNOSIS — F41.1 GENERALIZED ANXIETY DISORDER: ICD-10-CM

## 2021-01-04 DIAGNOSIS — I10 ESSENTIAL HYPERTENSION, BENIGN: ICD-10-CM

## 2021-01-04 DIAGNOSIS — I49.3 PVC'S (PREMATURE VENTRICULAR CONTRACTIONS): ICD-10-CM

## 2021-01-04 RX ORDER — SERTRALINE HYDROCHLORIDE 100 MG/1
100 TABLET, FILM COATED ORAL DAILY
Qty: 1 TABLET | Refills: 0 | COMMUNITY
Start: 2021-01-04 | End: 2021-01-06

## 2021-01-04 RX ORDER — METOPROLOL SUCCINATE 50 MG/1
50 TABLET, EXTENDED RELEASE ORAL DAILY
Qty: 1 TABLET | Refills: 0 | COMMUNITY
Start: 2021-01-04 | End: 2021-01-06

## 2021-01-04 NOTE — TELEPHONE ENCOUNTER
Ke Sloan is requesting a refill of:    Signed Prescriptions:                        Disp   Refills    sertraline (ZOLOFT) 100 MG tablet          1 tabl*0        Sig: Take 1 tablet (100 mg) by mouth daily  Authorizing Provider: SAMIRA CRUZ  Ordering User: CLINTON MISTRY    metoprolol succinate ER (TOPROL-XL) 50 MG *1 tabl*0        Sig: Take 1 tablet (50 mg) by mouth daily  Authorizing Provider: SAMIRA CRUZ  Ordering User: CLINTON MISTRY

## 2021-01-06 ENCOUNTER — OFFICE VISIT (OUTPATIENT)
Dept: FAMILY MEDICINE | Facility: CLINIC | Age: 61
End: 2021-01-06

## 2021-01-06 VITALS
HEART RATE: 67 BPM | WEIGHT: 186 LBS | OXYGEN SATURATION: 97 % | BODY MASS INDEX: 28.19 KG/M2 | HEIGHT: 68 IN | TEMPERATURE: 97.6 F | DIASTOLIC BLOOD PRESSURE: 78 MMHG | SYSTOLIC BLOOD PRESSURE: 110 MMHG

## 2021-01-06 DIAGNOSIS — F41.1 GENERALIZED ANXIETY DISORDER: ICD-10-CM

## 2021-01-06 DIAGNOSIS — I10 ESSENTIAL HYPERTENSION, BENIGN: ICD-10-CM

## 2021-01-06 DIAGNOSIS — I49.3 PVC'S (PREMATURE VENTRICULAR CONTRACTIONS): ICD-10-CM

## 2021-01-06 LAB
BUN SERPL-MCNC: 19 MG/DL (ref 7–25)
BUN/CREATININE RATIO: 18.4 (ref 6–22)
CALCIUM SERPL-MCNC: 9.7 MG/DL (ref 8.6–10.3)
CHLORIDE SERPLBLD-SCNC: 103 MMOL/L (ref 98–110)
CO2 SERPL-SCNC: 27.6 MMOL/L (ref 20–32)
CREAT SERPL-MCNC: 1.03 MG/DL (ref 0.7–1.18)
GLUCOSE SERPL-MCNC: 101 MG/DL (ref 60–99)
POTASSIUM SERPL-SCNC: 4.29 MMOL/L (ref 3.5–5.3)
SODIUM SERPL-SCNC: 140.2 MMOL/L (ref 135–146)

## 2021-01-06 PROCEDURE — 99213 OFFICE O/P EST LOW 20 MIN: CPT | Performed by: PHYSICIAN ASSISTANT

## 2021-01-06 PROCEDURE — 80048 BASIC METABOLIC PNL TOTAL CA: CPT | Performed by: PHYSICIAN ASSISTANT

## 2021-01-06 PROCEDURE — 36415 COLL VENOUS BLD VENIPUNCTURE: CPT | Performed by: PHYSICIAN ASSISTANT

## 2021-01-06 RX ORDER — LISINOPRIL AND HYDROCHLOROTHIAZIDE 12.5; 2 MG/1; MG/1
1 TABLET ORAL DAILY
Qty: 30 TABLET | Refills: 0 | Status: SHIPPED | OUTPATIENT
Start: 2021-01-06 | End: 2021-05-19

## 2021-01-06 RX ORDER — LISINOPRIL AND HYDROCHLOROTHIAZIDE 12.5; 2 MG/1; MG/1
1 TABLET ORAL DAILY
Qty: 90 TABLET | Refills: 3 | Status: SHIPPED | OUTPATIENT
Start: 2021-01-06 | End: 2021-01-06

## 2021-01-06 RX ORDER — SERTRALINE HYDROCHLORIDE 100 MG/1
100 TABLET, FILM COATED ORAL DAILY
Qty: 30 TABLET | Refills: 0 | Status: SHIPPED | OUTPATIENT
Start: 2021-01-06 | End: 2021-05-19

## 2021-01-06 RX ORDER — SERTRALINE HYDROCHLORIDE 100 MG/1
100 TABLET, FILM COATED ORAL DAILY
Qty: 90 TABLET | Refills: 3 | Status: SHIPPED | OUTPATIENT
Start: 2021-01-06 | End: 2021-01-06

## 2021-01-06 RX ORDER — METOPROLOL SUCCINATE 50 MG/1
50 TABLET, EXTENDED RELEASE ORAL DAILY
Qty: 30 TABLET | Refills: 0 | Status: SHIPPED | OUTPATIENT
Start: 2021-01-06 | End: 2021-05-19

## 2021-01-06 RX ORDER — METOPROLOL SUCCINATE 50 MG/1
50 TABLET, EXTENDED RELEASE ORAL DAILY
Qty: 90 TABLET | Refills: 3 | Status: SHIPPED | OUTPATIENT
Start: 2021-01-06 | End: 2021-01-06

## 2021-01-06 ASSESSMENT — ANXIETY QUESTIONNAIRES
3. WORRYING TOO MUCH ABOUT DIFFERENT THINGS: NOT AT ALL
GAD7 TOTAL SCORE: 0
7. FEELING AFRAID AS IF SOMETHING AWFUL MIGHT HAPPEN: NOT AT ALL
IF YOU CHECKED OFF ANY PROBLEMS ON THIS QUESTIONNAIRE, HOW DIFFICULT HAVE THESE PROBLEMS MADE IT FOR YOU TO DO YOUR WORK, TAKE CARE OF THINGS AT HOME, OR GET ALONG WITH OTHER PEOPLE: NOT DIFFICULT AT ALL
6. BECOMING EASILY ANNOYED OR IRRITABLE: NOT AT ALL
5. BEING SO RESTLESS THAT IT IS HARD TO SIT STILL: NOT AT ALL
1. FEELING NERVOUS, ANXIOUS, OR ON EDGE: NOT AT ALL
2. NOT BEING ABLE TO STOP OR CONTROL WORRYING: NOT AT ALL

## 2021-01-06 ASSESSMENT — PATIENT HEALTH QUESTIONNAIRE - PHQ9
SUM OF ALL RESPONSES TO PHQ QUESTIONS 1-9: 5
5. POOR APPETITE OR OVEREATING: NOT AT ALL

## 2021-01-06 ASSESSMENT — MIFFLIN-ST. JEOR: SCORE: 1459.25

## 2021-01-06 NOTE — PROGRESS NOTES
"CC: Medication Check    History:  HTN, PVCs: Takes metoprolol XR 50 mg, lisinopril-hydrochlorothiazide. Has done well stopping stopping extra lisinopril. Has not been taking BP at home. No chest pain, palpitations, SOB. Has been able to lose some weight with healthy diet and exercise, although didn't do as well through holidays. Up to date on eye exam.      Anxiety: Taking sertraline 100 mg daily since 2015. This has really helped to calm stress. Tolerating this well. No side effects. Does not see a therapist. Wishes to stay on this dose.     Sleeping:  Has good nights and bad nights of sleep, but mostly bad nights. Okay to fall asleep, but difficult to stay asleep. Has not tried anything, including melatonin.     PMH, MEDICATIONS, ALLERGIES, SOCIAL AND FAMILY HISTORY in [facility] and reviewed by me personally.    ROS negative other than the symptoms noted above in the HPI.      Examination   /78   Pulse 67   Temp 97.6  F (36.4  C) (Oral)   Ht 1.715 m (5' 7.5\")   Wt 84.4 kg (186 lb)   LMP 04/23/2013   SpO2 97%   BMI 28.70 kg/m       Constitutional: Sitting comfortably, in no acute distress. Vital signs noted  Eyes: pupils equal round reactive to light and accomodation, extra ocular movements intact  Ears: external canals and TMs free of abnormalities  Nose: patent, without mucosal abnormalities  Mouth and throat: without erythema or lesions of the mucosa  Neck:  no adenopathy, trachea midline and normal to palpation, thyroid normal to palpation  Cardiovascular:  regular rate and rhythm, no murmurs, clicks, or gallops  Respiratory:  normal respiratory rate and rhythm, lungs clear to auscultation  SKIN: No jaundice/pallor/rash.   Psychiatric: mentation appears normal and affect normal/bright    A/P    ICD-10-CM    1. Generalized anxiety disorder  F41.1 sertraline (ZOLOFT) 100 MG tablet   2. Essential hypertension, benign  I10 metoprolol succinate ER (TOPROL-XL) 50 MG 24 hr tablet     " lisinopril-hydrochlorothiazide (ZESTORETIC) 20-12.5 MG tablet   3. PVC's (premature ventricular contractions)  I49.3 metoprolol succinate ER (TOPROL-XL) 50 MG 24 hr tablet       DISCUSSION:  HTN, PVCs: BP well controlled today even off extra lisinopril. Will update BMP, and send MyChart with results. Refilled current medications for 1 year. Could consider further refills as part of fasting physical, as long as stable.     Anxiety: BRIAN/PHQ today show good control other than sleep. Refilled sertraline for 1 year. Could consider further refills as part of fasting physical.     Sleeping:  Recommended sleep hygeine, melatonin. She is unsure if she will try this, and is not interested in prescription options at this time.     follow up visit: 1 year    Mari Porter PA-C  [facility]

## 2021-01-06 NOTE — NURSING NOTE
Ke is here for a fasting med check.          Pre-visit Screening:  Immunizations:  up to date  Colonoscopy:  is up to date  Mammogram: is due and pt has it scheduled  Asthma Action Test/Plan:  NA  PHQ9:  Done today  GAD7:  Done today  Questioned patient about current smoking habits Pt. has never smoked.  Ok to leave detailed message on voice mail for today's visit only Yes, phone # cell

## 2021-01-07 ASSESSMENT — ANXIETY QUESTIONNAIRES: GAD7 TOTAL SCORE: 0

## 2021-01-15 ENCOUNTER — HEALTH MAINTENANCE LETTER (OUTPATIENT)
Age: 61
End: 2021-01-15

## 2021-02-01 DIAGNOSIS — I49.3 PVC'S (PREMATURE VENTRICULAR CONTRACTIONS): ICD-10-CM

## 2021-02-01 DIAGNOSIS — F41.1 GENERALIZED ANXIETY DISORDER: ICD-10-CM

## 2021-02-01 DIAGNOSIS — I10 ESSENTIAL HYPERTENSION, BENIGN: ICD-10-CM

## 2021-05-10 ENCOUNTER — MYC MEDICAL ADVICE (OUTPATIENT)
Dept: FAMILY MEDICINE | Facility: CLINIC | Age: 61
End: 2021-05-10

## 2021-05-19 ENCOUNTER — MYC MEDICAL ADVICE (OUTPATIENT)
Dept: FAMILY MEDICINE | Facility: CLINIC | Age: 61
End: 2021-05-19

## 2021-05-19 DIAGNOSIS — I49.3 PVC'S (PREMATURE VENTRICULAR CONTRACTIONS): ICD-10-CM

## 2021-05-19 DIAGNOSIS — F41.1 GENERALIZED ANXIETY DISORDER: ICD-10-CM

## 2021-05-19 DIAGNOSIS — I10 ESSENTIAL HYPERTENSION, BENIGN: ICD-10-CM

## 2021-05-19 RX ORDER — LISINOPRIL AND HYDROCHLOROTHIAZIDE 12.5; 2 MG/1; MG/1
1 TABLET ORAL DAILY
Qty: 90 TABLET | Refills: 2 | Status: SHIPPED | OUTPATIENT
Start: 2021-05-19 | End: 2022-01-04

## 2021-05-19 RX ORDER — SERTRALINE HYDROCHLORIDE 100 MG/1
100 TABLET, FILM COATED ORAL DAILY
Qty: 90 TABLET | Refills: 2 | Status: SHIPPED | OUTPATIENT
Start: 2021-05-19 | End: 2022-01-04

## 2021-05-19 RX ORDER — METOPROLOL SUCCINATE 50 MG/1
50 TABLET, EXTENDED RELEASE ORAL DAILY
Qty: 90 TABLET | Refills: 2 | Status: SHIPPED | OUTPATIENT
Start: 2021-05-19 | End: 2022-01-04

## 2021-09-04 ENCOUNTER — HEALTH MAINTENANCE LETTER (OUTPATIENT)
Age: 61
End: 2021-09-04

## 2021-12-02 ENCOUNTER — MYC MEDICAL ADVICE (OUTPATIENT)
Dept: FAMILY MEDICINE | Facility: CLINIC | Age: 61
End: 2021-12-02

## 2022-01-04 ENCOUNTER — OFFICE VISIT (OUTPATIENT)
Dept: FAMILY MEDICINE | Facility: CLINIC | Age: 62
End: 2022-01-04

## 2022-01-04 VITALS
OXYGEN SATURATION: 98 % | SYSTOLIC BLOOD PRESSURE: 122 MMHG | WEIGHT: 198.2 LBS | HEIGHT: 68 IN | HEART RATE: 62 BPM | TEMPERATURE: 98.2 F | BODY MASS INDEX: 30.04 KG/M2 | DIASTOLIC BLOOD PRESSURE: 82 MMHG

## 2022-01-04 DIAGNOSIS — Z13.220 SCREENING FOR LIPID DISORDERS: Primary | ICD-10-CM

## 2022-01-04 DIAGNOSIS — I10 ESSENTIAL HYPERTENSION, BENIGN: ICD-10-CM

## 2022-01-04 DIAGNOSIS — I49.3 PVC'S (PREMATURE VENTRICULAR CONTRACTIONS): ICD-10-CM

## 2022-01-04 DIAGNOSIS — F41.1 GENERALIZED ANXIETY DISORDER: ICD-10-CM

## 2022-01-04 PROCEDURE — 90686 IIV4 VACC NO PRSV 0.5 ML IM: CPT | Performed by: PHYSICIAN ASSISTANT

## 2022-01-04 PROCEDURE — 90471 IMMUNIZATION ADMIN: CPT | Performed by: PHYSICIAN ASSISTANT

## 2022-01-04 PROCEDURE — 99213 OFFICE O/P EST LOW 20 MIN: CPT | Mod: 25 | Performed by: PHYSICIAN ASSISTANT

## 2022-01-04 RX ORDER — SERTRALINE HYDROCHLORIDE 100 MG/1
100 TABLET, FILM COATED ORAL DAILY
Qty: 90 TABLET | Refills: 3 | Status: SHIPPED | OUTPATIENT
Start: 2022-01-04 | End: 2022-12-13

## 2022-01-04 RX ORDER — LISINOPRIL AND HYDROCHLOROTHIAZIDE 12.5; 2 MG/1; MG/1
1 TABLET ORAL DAILY
Qty: 90 TABLET | Refills: 3 | Status: SHIPPED | OUTPATIENT
Start: 2022-01-04 | End: 2022-12-13

## 2022-01-04 RX ORDER — METOPROLOL SUCCINATE 50 MG/1
50 TABLET, EXTENDED RELEASE ORAL DAILY
Qty: 90 TABLET | Refills: 3 | Status: SHIPPED | OUTPATIENT
Start: 2022-01-04 | End: 2022-12-13

## 2022-01-04 ASSESSMENT — ANXIETY QUESTIONNAIRES
7. FEELING AFRAID AS IF SOMETHING AWFUL MIGHT HAPPEN: NOT AT ALL
3. WORRYING TOO MUCH ABOUT DIFFERENT THINGS: SEVERAL DAYS
1. FEELING NERVOUS, ANXIOUS, OR ON EDGE: SEVERAL DAYS
6. BECOMING EASILY ANNOYED OR IRRITABLE: NOT AT ALL
IF YOU CHECKED OFF ANY PROBLEMS ON THIS QUESTIONNAIRE, HOW DIFFICULT HAVE THESE PROBLEMS MADE IT FOR YOU TO DO YOUR WORK, TAKE CARE OF THINGS AT HOME, OR GET ALONG WITH OTHER PEOPLE: NOT DIFFICULT AT ALL
5. BEING SO RESTLESS THAT IT IS HARD TO SIT STILL: NOT AT ALL
GAD7 TOTAL SCORE: 3
2. NOT BEING ABLE TO STOP OR CONTROL WORRYING: SEVERAL DAYS

## 2022-01-04 ASSESSMENT — PATIENT HEALTH QUESTIONNAIRE - PHQ9
5. POOR APPETITE OR OVEREATING: NOT AT ALL
SUM OF ALL RESPONSES TO PHQ QUESTIONS 1-9: 4

## 2022-01-04 ASSESSMENT — MIFFLIN-ST. JEOR: SCORE: 1509.59

## 2022-01-04 NOTE — PROGRESS NOTES
"CC: Medication Check    History:  HTN, PVCs:  Pt is treated for HTN and PVCs. Taking lisinopril-hydrochlorothiazide and metoprolol XL. Has been taking consistently. Denies any side effects. Does not check BP at home, but could if needed. Last eye exam was in the past 4 months. Denies any chest pain, palpitations, SOB. Has not been active, but plans to improve diet and exercise soon. Weight has increased.     Anxiety:  Takes sertraline 100 mg daily. Has been on this since 2015. Continues to feel that this is helpful. Used to have very active mind, worried about everything, but this is improved.     Chronic insomnia:  Continues to be an issue, but stable. Not interested in prescription. Has been having some occasional snoring. Thinks it will improve with better diet and exercise.    PMH, MEDICATIONS, ALLERGIES, SOCIAL AND FAMILY HISTORY in [facility] and reviewed by me personally.      ROS negative other than the symptoms noted above in the HPI.        Examination   /82 (BP Location: Left arm, Patient Position: Sitting, Cuff Size: Adult Large)   Pulse 62   Temp 98.2  F (36.8  C) (Temporal)   Ht 1.715 m (5' 7.5\")   Wt 89.9 kg (198 lb 3.2 oz)   LMP 04/23/2013   SpO2 98%   BMI 30.58 kg/m       Constitutional: Sitting comfortably, in no acute distress. Vital signs noted  Neck:  no adenopathy, trachea midline and normal to palpation, thyroid normal to palpation  Cardiovascular:  regular rate and rhythm, no murmurs, clicks, or gallops  Respiratory:  normal respiratory rate and rhythm, lungs clear to auscultation  SKIN: No jaundice/pallor/rash.   Psychiatric: mentation appears normal and affect normal/bright        A/P    ICD-10-CM    1. Screening for lipid disorders  Z13.220 VENOUS COLLECTION     Lipid Panel (BFP)   2. Generalized anxiety disorder  F41.1 sertraline (ZOLOFT) 100 MG tablet   3. Essential hypertension, benign  I10 metoprolol succinate ER (TOPROL-XL) 50 MG 24 hr tablet     lisinopril-hydrochlorothiazide " (ZESTORETIC) 20-12.5 MG tablet     VENOUS COLLECTION     Basic Metabolic Panel (BFP)   4. PVC's (premature ventricular contractions)  I49.3 metoprolol succinate ER (TOPROL-XL) 50 MG 24 hr tablet       DISCUSSION:  HTN, PVCs:  Controlled. Due to update labs, but also due for pap smear. Agrees to come back fasting 1/28/2022. Will wait to do labs at that physical. Refilled for 1 year.     Anxiety:  PHQ/BRIAN updated today. Well controlled. Will refill for 1 year without change.    Chronic insomnia:  Continue to monitor, should improve with lifestyle improvement.       follow up visit: 1 year    Mari Mercer PA-C  [facility]

## 2022-01-04 NOTE — NURSING NOTE
Chief Complaint   Patient presents with     Recheck Medication     non-fasting today, refill medications     Pre-visit Screening:  Immunizations:  up to date  Colonoscopy:  is up to date- scheduled for 01/26/22  Mammogram: is up to date  Asthma Action Test/Plan:  NA  PHQ9:  Done today  GAD7:  Done today  Questioned patient about current smoking habits Pt. has never smoked.  Ok to leave detailed message on voice mail for today's visit only Yes, phone # [phone]

## 2022-01-05 ASSESSMENT — ANXIETY QUESTIONNAIRES: GAD7 TOTAL SCORE: 3

## 2022-01-26 ENCOUNTER — TRANSFERRED RECORDS (OUTPATIENT)
Dept: FAMILY MEDICINE | Facility: CLINIC | Age: 62
End: 2022-01-26

## 2022-01-28 ENCOUNTER — OFFICE VISIT (OUTPATIENT)
Dept: FAMILY MEDICINE | Facility: CLINIC | Age: 62
End: 2022-01-28

## 2022-01-28 VITALS
DIASTOLIC BLOOD PRESSURE: 78 MMHG | SYSTOLIC BLOOD PRESSURE: 122 MMHG | BODY MASS INDEX: 29.4 KG/M2 | WEIGHT: 194 LBS | HEART RATE: 71 BPM | TEMPERATURE: 97.4 F | HEIGHT: 68 IN | OXYGEN SATURATION: 96 %

## 2022-01-28 DIAGNOSIS — Z13.220 SCREENING FOR LIPID DISORDERS: ICD-10-CM

## 2022-01-28 DIAGNOSIS — Z12.4 SCREENING FOR CERVICAL CANCER: ICD-10-CM

## 2022-01-28 DIAGNOSIS — Z01.419 ENCOUNTER FOR GYNECOLOGICAL EXAMINATION WITHOUT ABNORMAL FINDING: Primary | ICD-10-CM

## 2022-01-28 DIAGNOSIS — Z13.228 SCREENING FOR METABOLIC DISORDER: ICD-10-CM

## 2022-01-28 LAB
BUN SERPL-MCNC: 21 MG/DL (ref 7–25)
BUN/CREATININE RATIO: 20.6 (ref 6–22)
CALCIUM SERPL-MCNC: 9.8 MG/DL (ref 8.6–10.3)
CHLORIDE SERPLBLD-SCNC: 101.6 MMOL/L (ref 98–110)
CHOLEST SERPL-MCNC: 196 MG/DL (ref 0–199)
CHOLEST/HDLC SERPL: 4 {RATIO} (ref 0–5)
CO2 SERPL-SCNC: 27.8 MMOL/L (ref 20–32)
CREAT SERPL-MCNC: 1.02 MG/DL (ref 0.6–1.3)
GLUCOSE SERPL-MCNC: 98 MG/DL (ref 60–99)
HDLC SERPL-MCNC: 47 MG/DL (ref 40–150)
LDLC SERPL CALC-MCNC: 125 MG/DL (ref 0–130)
POTASSIUM SERPL-SCNC: 4.55 MMOL/L (ref 3.5–5.3)
SODIUM SERPL-SCNC: 139.2 MMOL/L (ref 135–146)
TRIGL SERPL-MCNC: 121 MG/DL (ref 0–149)

## 2022-01-28 PROCEDURE — 99396 PREV VISIT EST AGE 40-64: CPT | Performed by: PHYSICIAN ASSISTANT

## 2022-01-28 PROCEDURE — 80061 LIPID PANEL: CPT | Performed by: PHYSICIAN ASSISTANT

## 2022-01-28 PROCEDURE — 80048 BASIC METABOLIC PNL TOTAL CA: CPT | Performed by: PHYSICIAN ASSISTANT

## 2022-01-28 PROCEDURE — 87624 HPV HI-RISK TYP POOLED RSLT: CPT | Mod: 90 | Performed by: PHYSICIAN ASSISTANT

## 2022-01-28 PROCEDURE — 36415 COLL VENOUS BLD VENIPUNCTURE: CPT | Performed by: PHYSICIAN ASSISTANT

## 2022-01-28 PROCEDURE — 88142 CYTOPATH C/V THIN LAYER: CPT | Mod: 90 | Performed by: PHYSICIAN ASSISTANT

## 2022-01-28 ASSESSMENT — MIFFLIN-ST. JEOR: SCORE: 1490.54

## 2022-01-28 NOTE — PROGRESS NOTES
Chief Complaint:  Physical Exam    SUBJECTIVE:   Ke Sloan is a 61 year old female presents for routine health maintenance.    Current concerns:     Menses are absent    Patient's last menstrual period was 04/23/2013.    Was last Pap smear normal: Yes  Due for mammogram:  Yes    Body mass index is 29.94 kg/m .    Present exercise habits:  Nothing formal  Present dietary habits:  eats regular meals and follows a balanced nutrition diet    Calcium intake: 1-2 servings daily  Vit D intake: is not taking supplement    Is the patient a smoker? No  If yes, smoking cessation advised and counseling provided.     Cardiovascular risk factors: none    Over the past few weeks, have you felt down or depressed? Little interest or pleasure in doing things? No concerns    If in a relationship are there any Domestic violence concern: No    Last dental appointment:  this year  Last optical appointment:  this year    Was the patient born between [id number] and has not had Hep C testing?  Patient has already been tested    I have reviewed the following histories: Past Medical History, Past Surgical History, Social History, Family History, Problem List, Medication List and Allergies    Past Medical History:   Diagnosis Date     MIGRAINE NOS W/O MENTN INTRACTABLE 7/21/2002     Family History   Problem Relation Age of Onset     Hypertension Mother      Diabetes Mother      Respiratory Mother         emphysema     Anxiety Disorder Mother      Hypertension Father      Cerebrovascular Disease Father         60 YEARS OLD WHEN HE HAD A CVA     Sleep Apnea Father      Diabetes Father      Diabetes Maternal Grandmother         INSULIN     Respiratory Paternal Grandfather         EMPHYSEMA     Thyroid Disease Sister      Diabetes Sister      Thyroid Cancer Sister         partial thyroidectomy     Hypertension Sister      Anxiety Disorder Sister      Aortic Valve Replacement Daughter         aortic valve stenosis, bicuspid     Social History      Socioeconomic History     Marital status:      Spouse name: Not on file     Number of children: Not on file     Years of education: Not on file     Highest education level: Not on file   Occupational History     Not on file   Tobacco Use     Smoking status: Never Smoker     Smokeless tobacco: Never Used   Substance and Sexual Activity     Alcohol use: Yes     Alcohol/week: 0.8 standard drinks     Types: 1 Standard drinks or equivalent per week     Comment: weekends     Drug use: No     Sexual activity: Yes     Partners: Male   Other Topics Concern     Not on file   Social History Narrative     Not on file     Social Determinants of Health     Financial Resource Strain: Not on file   Food Insecurity: Not on file   Transportation Needs: Not on file   Physical Activity: Not on file   Stress: Not on file   Social Connections: Not on file   Intimate Partner Violence: Not on file   Housing Stability: Not on file     Past Surgical History:   Procedure Laterality Date     COLONOSCOPY  06/01/2011    Normal/ rpt in 10 yrs     HC PTA RENAL/VISCERAL ARTERY  01/01/2008    fibromuscular dysplasia, renal     RETINAL REATTACHMENT Right 09/2020     ZZC LIGATE FALLOPIAN TUBE      AGE 32     ZZC NONSPECIFIC PROCEDURE  01/01/1998    LASIK EYE SURGERY       ROS:  E/M: NEGATIVE for ear, nose, mouth and throat problems  R: NEGATIVE for significant/chronic cough or SOB  CV: NEGATIVE for chest pain or palpitations  GI: NEGATIVE for abdominal pain, chronic diarrhea or constipation  :  NEGATIVE for dysuria, hematuria or vaginal discharge. No sexual health concerns.       Current Outpatient Medications   Medication     lisinopril-hydrochlorothiazide (ZESTORETIC) 20-12.5 MG tablet     metoprolol succinate ER (TOPROL-XL) 50 MG 24 hr tablet     sertraline (ZOLOFT) 100 MG tablet     olopatadine (PATADAY) 0.2 % ophthalmic solution     No current facility-administered medications for this visit.       Patient Active Problem List     "Diagnosis Date Noted     Chronic partial retinal detachment 11/01/2020     Priority: Medium     TRA (obstructive sleep apnea) 08/01/2017     Priority: Medium     Generalized anxiety disorder 07/22/2015     Priority: Medium     Health Care Home 11/05/2013     Priority: Medium     State Tier Level:  Tier 1  Status:  N/A  Care Coordinator:  N/A    See Letters for [facility] Care Plan           Hyperplasia of renal artery/ angioplasty 2008 bilateral renal arteries 05/27/2013     Priority: Medium     Frequent PVCs 11/15/2012     Priority: Medium     ACP (advance care planning) 01/20/2012     Priority: Medium     Advance Care Planning 10/11/2017: ACP Review of Chart / Resources Provided:  Reviewed chart for advance care plan.  Ke Sloan has no plan or code status on file however states presence of ACP document. Copy requested.   Added by Melanie Johnson                     Essential hypertension, benign 01/06/2008     Priority: Medium     Other chronic allergic conjunctivitis 07/21/2002     Priority: Medium         OBJECTIVE:  /78 (BP Location: Left arm, Patient Position: Sitting, Cuff Size: Adult Large)   Pulse 71   Temp 97.4  F (36.3  C) (Temporal)   Ht 1.715 m (5' 7.5\")   Wt 88 kg (194 lb)   LMP 04/23/2013   SpO2 96%   BMI 29.94 kg/m          General: 61 year old female who appears her stated age. Vital signs noted  Head: Normocephalic  Eyes: pupils equal round reactive to light and accomodation, extra ocular movements intact  Ears: external canals and TMs free of abnormalities  Nose: patent, without mucosal abnormalities  Mouth and throat: without erythema or lesions of the mucosa  Neck: supple, without adenopathy or thyromegaly  Lungs: clear to auscultation, no wheezing or crackles  Breasts: skin without rash, no dominant mass, no nipple discharge, or axillary adenopathy  Cv: regular rate and rhythm, normal s1 and s2 without murmur or click  Abd: soft, non-tender, no masses, no hepatomegaly or " "splenomegaly.   (female): normal female external genitalia, normal urethral meatus, vaginal mucosa, normal cervix/adnexa/uterus without masses or discharge  Ms: normal muscle tone & symmetry  Skin: clear to inspection and with no palpable abnormalities.  Neuro: sensation and motor function grossly intact; cranial nerves without obvious abnormalities.    ASSESSMENT/PLAN:    Encounter for gynecological examination without abnormal finding  Ke is doing well today. Will update fasting labs, pap, and send MyChart with result when available.     Screening for metabolic disorder  - VENOUS COLLECTION  - Basic Metabolic Panel (BFP)    Screening for lipid disorders  - VENOUS COLLECTION  - Lipid Panel (BFP)    Screening for cervical cancer  - ThinPrep Pap and HPV (mRNa E6/E7) HPV always run([facility])     reports that she has never smoked. She has never used smokeless tobacco.    Estimated body mass index is 29.94 kg/m  as calculated from the following:    Height as of this encounter: 1.715 m (5' 7.5\").    Weight as of this encounter: 88 kg (194 lb).  Weight management plan: Discussed healthy diet and exercise guidelines      Labs pending:      Fasting glucose      Fasting lipids       Pap smear  Meds Suggested:      Vitamin D       Calcium  Tests Recommended:      Regular Dental Examinations        Eye exam  Behavior Modifications:       Cardiovascular exercise 3 times per week--enough to get your Target Heart rate  Other recommendations:     BMI noted and discussed      Regular breast exam     Encouraged My Chart    Counseling Resources:  ATP IV Guidelines  Pooled Cohorts Equation Calculator  Breast Cancer Risk Calculator  FRAX Risk Assessment  ICSI Preventive Guidelines  Dietary Guidelines for Americans, 2010  Bday's MyPlate            Mari Mercer PA-C  1/28/2022,m  "

## 2022-01-28 NOTE — NURSING NOTE
Chief Complaint   Patient presents with     Physical     fasting         Pre-visit Screening:  Immunizations:  up to date  Colonoscopy:  is up to date  Mammogram: is up to date  Asthma Action Test/Plan:  NA  PHQ9:  UTD  GAD7:  UTD  Questioned patient about current smoking habits Pt. has never smoked.  Ok to leave detailed message on voice mail for today's visit only Yes, phone # [phone]

## 2022-02-01 LAB
CLINICAL HISTORY - QUEST: NORMAL
COMMENT - QUEST: NORMAL
CYTOTECHNOLOGIST - QUEST: NORMAL
DESCRIPTIVE DIAGNOSIS - QUEST: NORMAL
HPV MRNA E6/E7: NOT DETECTED
LAST PAP DX - QUEST: NORMAL
LMP - QUEST: NORMAL
PREV BX DX - QUEST: NORMAL
SOURCE: NORMAL
STATEMENT OF ADEQUACY - QUEST: NORMAL

## 2022-04-18 ENCOUNTER — TRANSFERRED RECORDS (OUTPATIENT)
Dept: FAMILY MEDICINE | Facility: CLINIC | Age: 62
End: 2022-04-18

## 2022-04-18 LAB — MAMMOGRAM: NORMAL

## 2022-10-16 ENCOUNTER — HEALTH MAINTENANCE LETTER (OUTPATIENT)
Age: 62
End: 2022-10-16

## 2022-12-13 ENCOUNTER — OFFICE VISIT (OUTPATIENT)
Dept: FAMILY MEDICINE | Facility: CLINIC | Age: 62
End: 2022-12-13

## 2022-12-13 VITALS
OXYGEN SATURATION: 96 % | DIASTOLIC BLOOD PRESSURE: 70 MMHG | HEART RATE: 63 BPM | TEMPERATURE: 97.2 F | WEIGHT: 203 LBS | BODY MASS INDEX: 30.77 KG/M2 | SYSTOLIC BLOOD PRESSURE: 112 MMHG | HEIGHT: 68 IN

## 2022-12-13 DIAGNOSIS — I10 ESSENTIAL HYPERTENSION, BENIGN: Primary | ICD-10-CM

## 2022-12-13 DIAGNOSIS — I49.3 PVC'S (PREMATURE VENTRICULAR CONTRACTIONS): ICD-10-CM

## 2022-12-13 DIAGNOSIS — Z83.49 FAMILY HISTORY OF THYROID DISEASE: ICD-10-CM

## 2022-12-13 DIAGNOSIS — F41.1 GENERALIZED ANXIETY DISORDER: ICD-10-CM

## 2022-12-13 PROCEDURE — 90471 IMMUNIZATION ADMIN: CPT | Performed by: PHYSICIAN ASSISTANT

## 2022-12-13 PROCEDURE — 90686 IIV4 VACC NO PRSV 0.5 ML IM: CPT | Performed by: PHYSICIAN ASSISTANT

## 2022-12-13 PROCEDURE — 99213 OFFICE O/P EST LOW 20 MIN: CPT | Mod: 25 | Performed by: PHYSICIAN ASSISTANT

## 2022-12-13 PROCEDURE — 84443 ASSAY THYROID STIM HORMONE: CPT | Mod: 90 | Performed by: PHYSICIAN ASSISTANT

## 2022-12-13 PROCEDURE — 80048 BASIC METABOLIC PNL TOTAL CA: CPT | Performed by: PHYSICIAN ASSISTANT

## 2022-12-13 PROCEDURE — 36415 COLL VENOUS BLD VENIPUNCTURE: CPT | Performed by: PHYSICIAN ASSISTANT

## 2022-12-13 RX ORDER — MOMETASONE FUROATE 1 MG/G
CREAM TOPICAL
COMMUNITY
Start: 2022-08-18 | End: 2023-06-29

## 2022-12-13 RX ORDER — LISINOPRIL AND HYDROCHLOROTHIAZIDE 12.5; 2 MG/1; MG/1
1 TABLET ORAL DAILY
Qty: 90 TABLET | Refills: 3 | Status: SHIPPED | OUTPATIENT
Start: 2022-12-13 | End: 2023-01-17

## 2022-12-13 RX ORDER — SERTRALINE HYDROCHLORIDE 100 MG/1
100 TABLET, FILM COATED ORAL DAILY
Qty: 90 TABLET | Refills: 3 | Status: SHIPPED | OUTPATIENT
Start: 2022-12-13 | End: 2023-01-17

## 2022-12-13 RX ORDER — METOPROLOL SUCCINATE 50 MG/1
50 TABLET, EXTENDED RELEASE ORAL DAILY
Qty: 90 TABLET | Refills: 3 | Status: SHIPPED | OUTPATIENT
Start: 2022-12-13 | End: 2023-01-17

## 2022-12-13 ASSESSMENT — PATIENT HEALTH QUESTIONNAIRE - PHQ9
5. POOR APPETITE OR OVEREATING: SEVERAL DAYS
SUM OF ALL RESPONSES TO PHQ QUESTIONS 1-9: 8

## 2022-12-13 ASSESSMENT — ANXIETY QUESTIONNAIRES
2. NOT BEING ABLE TO STOP OR CONTROL WORRYING: SEVERAL DAYS
7. FEELING AFRAID AS IF SOMETHING AWFUL MIGHT HAPPEN: NOT AT ALL
GAD7 TOTAL SCORE: 4
1. FEELING NERVOUS, ANXIOUS, OR ON EDGE: SEVERAL DAYS
3. WORRYING TOO MUCH ABOUT DIFFERENT THINGS: SEVERAL DAYS
IF YOU CHECKED OFF ANY PROBLEMS ON THIS QUESTIONNAIRE, HOW DIFFICULT HAVE THESE PROBLEMS MADE IT FOR YOU TO DO YOUR WORK, TAKE CARE OF THINGS AT HOME, OR GET ALONG WITH OTHER PEOPLE: NOT DIFFICULT AT ALL
5. BEING SO RESTLESS THAT IT IS HARD TO SIT STILL: NOT AT ALL
GAD7 TOTAL SCORE: 4
6. BECOMING EASILY ANNOYED OR IRRITABLE: NOT AT ALL

## 2022-12-13 NOTE — NURSING NOTE
Chief Complaint   Patient presents with     Recheck Medication     Mental health and bp med check, non fasting         Pre-visit Screening:  Immunizations:  up to date  Colonoscopy:  is up to date  Mammogram: is up to date  Asthma Action Test/Plan:  NA  PHQ9:  NA  GAD7:  NA  Questioned patient about current smoking habits Pt. has never smoked.  Ok to leave detailed message on voice mail for today's visit only Yes, phone # [phone]

## 2022-12-13 NOTE — PROGRESS NOTES
"CC: Medication Check    History:  HTN, PVCs:  Takes lisinopril-hydrochlorothiazide, metoprolol XL. Takes consistently. No side effects. Does not check BP at home.     Up to date on eye exam. Denies any chest pain, palpitations, SOB. Has not been active, and unfortunately weight has further increased.      Anxiety:  Stable on sertraline 100 mg daily.   Takes sertraline 100 mg daily. Feels like this is working well despite several work stressors and her law firm being understaffed.    PMH, MEDICATIONS, ALLERGIES, SOCIAL AND FAMILY HISTORY in [facility] and reviewed by me personally.    ROS negative other than the symptoms noted above in the HPI.      Examination   /70 (BP Location: Right arm, Patient Position: Sitting, Cuff Size: Adult Regular)   Pulse 63   Temp 97.2  F (36.2  C) (Temporal)   Ht 1.715 m (5' 7.5\")   Wt 92.1 kg (203 lb)   LMP 04/23/2013   SpO2 96%   BMI 31.33 kg/m       Constitutional: Sitting comfortably, in no acute distress. Vital signs noted  Neck:  no adenopathy, trachea midline and normal to palpation, thyroid normal to palpation  Cardiovascular:  regular rate and rhythm, no murmurs, clicks, or gallops  Respiratory:  normal respiratory rate and rhythm, lungs clear to auscultation  SKIN: No jaundice/pallor/rash.   Psychiatric: mentation appears normal and affect normal/bright        A/P    ICD-10-CM    1. Essential hypertension, benign  I10 lisinopril-hydrochlorothiazide (ZESTORETIC) 20-12.5 MG tablet     metoprolol succinate ER (TOPROL XL) 50 MG 24 hr tablet     VENOUS COLLECTION     Basic Metabolic Panel (BFP)      2. PVC's (premature ventricular contractions)  I49.3 metoprolol succinate ER (TOPROL XL) 50 MG 24 hr tablet      3. Generalized anxiety disorder  F41.1 sertraline (ZOLOFT) 100 MG tablet      4. Family history of thyroid disease  Z83.49 TSH WITH FREE T4 REFLEX ([facility])          DISCUSSION:HTN, PVCs:  BP well controlled today, and PVC break through is minimal. Will continue to " monitor. Will refill medications for 1 year.      Anxiety:  PHQ/BRIAN updated today and doing relatively well today despite stressors. Would like to continue on same medication as work should get better soon. Refilled for 1 year.      Fatigue, FH:  Will recheck TSH, and send MyChart.     follow up visit: 6 months, fasting physical    Mari Mercer PA-C  [facility]

## 2022-12-14 LAB
BUN SERPL-MCNC: 21 MG/DL (ref 7–25)
BUN/CREATININE RATIO: 22.3
CALCIUM SERPL-MCNC: 9.8 MG/DL (ref 8.6–10.3)
CHLORIDE SERPLBLD-SCNC: 102 MMOL/L (ref 98–110)
CO2 SERPL-SCNC: 30.7 MMOL/L (ref 20–32)
CREAT SERPL-MCNC: 0.94 MG/DL (ref 0.6–1.3)
GLUCOSE SERPL-MCNC: 92 MG/DL (ref 60–99)
POTASSIUM SERPL-SCNC: 4.08 MMOL/L (ref 3.5–5.3)
SODIUM SERPL-SCNC: 139.1 MMOL/L (ref 135–146)
TSH SERPL-ACNC: 1.2 MIU/L (ref 0.4–4.5)

## 2023-03-26 ENCOUNTER — HEALTH MAINTENANCE LETTER (OUTPATIENT)
Age: 63
End: 2023-03-26

## 2023-06-25 ENCOUNTER — CARE COORDINATION (OUTPATIENT)
Dept: FAMILY MEDICINE | Facility: CLINIC | Age: 63
End: 2023-06-25

## 2023-06-27 NOTE — PROGRESS NOTES
Care Coordination Initial Assessment    The patient was admitted into [facility] on 6/20/23 for acute cholecystitis. She was discharged on 6/23/23 with instructions to follow up with her PCP and with general surgery.     PCP: Mari Mercer    Referral Source:  ED/IP List    Utilization:   Last PCP Appt.: 12/13/22    Health Maintenance Reviewed: Yes    Current Medical Health Concerns:   Please review patients current medical problem list.    Patient/Caregiver Understanding: Yes    Medication Management:   Method of Taking:  Self set up in med box  Patient has understanding of regimen and is adherent:  Yes  Medications Reviewed: Yes    Functional Status:   Independent with all ADL/IADL's    Current Behavioral Health Concerns:   No concerns     Patient/Caregiver Understanding:  yes    Psychosocial:  No concerns to review-patient is stable     Gaps:    Health Maintenance: Will review fully at patients hospital follow up visit     Resources Given:    NA    Plan:   The patient is doing better now that she is back at home but is still sore. She will have her follow up with general surgery and per instructions from hospital, she was able to get her hospital follow up visit scheduled with Mari on 6/29/23 where all questions and concerns will be addressed.

## 2023-06-29 ENCOUNTER — OFFICE VISIT (OUTPATIENT)
Dept: FAMILY MEDICINE | Facility: CLINIC | Age: 63
End: 2023-06-29

## 2023-06-29 VITALS
WEIGHT: 192 LBS | TEMPERATURE: 97.2 F | HEIGHT: 67 IN | SYSTOLIC BLOOD PRESSURE: 110 MMHG | OXYGEN SATURATION: 96 % | HEART RATE: 69 BPM | BODY MASS INDEX: 30.13 KG/M2 | DIASTOLIC BLOOD PRESSURE: 70 MMHG

## 2023-06-29 DIAGNOSIS — I10 ESSENTIAL HYPERTENSION, BENIGN: ICD-10-CM

## 2023-06-29 DIAGNOSIS — F41.1 GENERALIZED ANXIETY DISORDER: ICD-10-CM

## 2023-06-29 DIAGNOSIS — Z09 HOSPITAL DISCHARGE FOLLOW-UP: Primary | ICD-10-CM

## 2023-06-29 DIAGNOSIS — Z90.49 HISTORY OF LAPAROSCOPIC CHOLECYSTECTOMY: ICD-10-CM

## 2023-06-29 LAB
ALBUMIN SERPL-MCNC: 4.5 G/DL (ref 3.6–5.1)
ALBUMIN/GLOB SERPL: 1.3 {RATIO} (ref 1–2.5)
ALP SERPL-CCNC: 97 U/L (ref 33–130)
ALT 1742-6: 70 U/L (ref 0–32)
AST 1920-8: 50 U/L (ref 0–35)
BILIRUB SERPL-MCNC: 1.2 MG/DL (ref 0.2–1.2)
BUN SERPL-MCNC: 27 MG/DL (ref 7–25)
BUN/CREATININE RATIO: 23.5 (ref 6–32)
CALCIUM SERPL-MCNC: 10.1 MG/DL (ref 8.6–10.3)
CHLORIDE SERPLBLD-SCNC: 101.5 MMOL/L (ref 98–110)
CO2 SERPL-SCNC: 27.1 MMOL/L (ref 20–32)
CREAT SERPL-MCNC: 1.15 MG/DL (ref 0.6–1.3)
ERYTHROCYTE [DISTWIDTH] IN BLOOD BY AUTOMATED COUNT: 12.7 %
GLOBULIN, CALCULATED - QUEST: 3.5 (ref 1.9–3.7)
GLUCOSE SERPL-MCNC: 90 MG/DL (ref 60–99)
HCT VFR BLD AUTO: 45.6 % (ref 35–47)
HEMOGLOBIN: 14.5 G/DL (ref 11.7–15.7)
MCH RBC QN AUTO: 30.5 PG (ref 26–33)
MCHC RBC AUTO-ENTMCNC: 31.8 G/DL (ref 31–36)
MCV RBC AUTO: 96 FL (ref 78–100)
PLATELET COUNT - QUEST: 232 10^9/L (ref 150–375)
POTASSIUM SERPL-SCNC: 4.68 MMOL/L (ref 3.5–5.3)
PROT SERPL-MCNC: 8 G/DL (ref 6.1–8.1)
RBC # BLD AUTO: 4.75 10*12/L (ref 3.8–5.2)
SODIUM SERPL-SCNC: 137 MMOL/L (ref 135–146)
WBC # BLD AUTO: 6.3 10*9/L (ref 4–11)

## 2023-06-29 PROCEDURE — 99496 TRANSJ CARE MGMT HIGH F2F 7D: CPT | Performed by: PHYSICIAN ASSISTANT

## 2023-06-29 PROCEDURE — 80053 COMPREHEN METABOLIC PANEL: CPT | Performed by: PHYSICIAN ASSISTANT

## 2023-06-29 PROCEDURE — 36415 COLL VENOUS BLD VENIPUNCTURE: CPT | Performed by: PHYSICIAN ASSISTANT

## 2023-06-29 PROCEDURE — 85027 COMPLETE CBC AUTOMATED: CPT | Performed by: PHYSICIAN ASSISTANT

## 2023-06-29 RX ORDER — HYDROCODONE BITARTRATE AND ACETAMINOPHEN 5; 325 MG/1; MG/1
1 TABLET ORAL EVERY 6 HOURS PRN
COMMUNITY
Start: 2023-06-23 | End: 2023-06-29

## 2023-06-29 NOTE — PROGRESS NOTES
"  Assessment & Plan     Hospital discharge follow-up  History of laparoscopic cholecystectomy  Ke is doing well postoperatively. Reviewed hospital notes and discharge summary. Will update CBC and CMP as LFTs were still mildly elevated at discharge. Will contact her with results, and if still elevated, may need to do recheck in another 1-2 months. Would consider lab only.   - VENOUS COLLECTION  - Comprehensive Metobolic Panel (BFP)  - Hemogram Platelet (BFP)    Essential hypertension, benign  Controlled. Not needing refills    Generalized anxiety disorder  Controlled. Not needing refills.     Review of external notes as documented elsewhere in note  30 minutes spent by me on the date of the encounter doing chart review, review of outside records, review of test results, interpretation of tests, patient visit and documentation      MED REC REQUIRED:  Post Medication Reconciliation Status: discharge medications reconciled, continue medications without change     BMI:   Estimated body mass index is 29.85 kg/m  as calculated from the following:    Height as of this encounter: 1.708 m (5' 7.25\").    Weight as of this encounter: 87.1 kg (192 lb).   Weight management plan: Discussed healthy diet and exercise guidelines    FUTURE APPOINTMENTS:       - Follow-up visit in 1-2 months if labs need to be rechecked.    No follow-ups on file.    Mari Mercer PA-C  [facility]    Subjective   Ke is a 63 year old, presenting for the following health issues:  Hospital F/U (Gall bladder removal on 6/22/23/[facility] in [place], [place]/6/20/2023 - 6/22/2023) and Surgical Followup (Gall bladder removal on 6/22/23)    HPI   Developed upper abdominal pain 6/19. Had dinner that night and pain worsening. Wasn't going away and getting to the point where so went to ER 6/20 and was found to have cholecystitis. Had cholecystectomy 6/22/2023 and was discharged 6/23/2023. Never needed pain medication " "even OTC after discharge.     Hospital Follow-up Visit:    Hospital/Nursing Home/IP Rehab Facility: [facility]  Date of Admission: 6/20/2023  Date of Discharge: 6/23/23  Reason(s) for Admission: abdominal pain, SOB / lead to cholecystectomy    Was your hospitalization related to COVID-19? No   Problems taking medications regularly:  None  Medication changes since discharge: None  Problems adhering to non-medication therapy:  None    Summary of hospitalization:  CareEverywhere information obtained and reviewed  Diagnostic Tests/Treatments reviewed.  Follow up needed: none  Other Healthcare Providers Involved in Patient s Care:         None  Update since discharge: improved.   Plan of care communicated with patient       Review of Systems   Constitutional, HEENT, cardiovascular, pulmonary, GI, , musculoskeletal, neuro, skin, endocrine and psych systems are negative, except as otherwise noted.      Objective    /70 (BP Location: Right arm, Patient Position: Sitting, Cuff Size: Adult Large)   Pulse 69   Temp 97.2  F (36.2  C) (Temporal)   Ht 1.708 m (5' 7.25\")   Wt 87.1 kg (192 lb)   LMP 04/20/2013   SpO2 96%   BMI 29.85 kg/m    Body mass index is 29.85 kg/m .  Physical Exam   GENERAL: healthy, alert and no distress  NECK: no adenopathy, no asymmetry, masses, or scars and thyroid normal to palpation  RESP: lungs clear to auscultation - no rales, rhonchi or wheezes  CV: regular rate and rhythm, normal S1 S2, no S3 or S4, no murmur, click or rub, no peripheral edema and peripheral pulses strong  ABDOMEN: soft, nontender, no hepatosplenomegaly, no masses and bowel sounds normal. Incision sites intact. No surrounding erythema, no dehiscence.  MS: no gross musculoskeletal defects noted, no edema    Results for orders placed or performed in visit on 06/29/23 (from the past 24 hour(s))   Hemogram Platelet (BFP)   Result Value Ref Range    WBC 6.3 4.0 - 11 10*9/L    RBC Count 4.75 3.8 - 5.2 " 10*12/L    Hemoglobin 14.5 11.7 - 15.7 g/dL    Hematocrit 45.6 35.0 - 47.0 %    MCV 96.0 78 - 100 fL    MCH 30.5 26 - 33 pg    MCHC 31.8 31 - 36 g/dL    RDW 12.7 %    Platelet Count 232 150 - 375 10^9/L   Comprehensive Metobolic Panel (BFP)   Result Value Ref Range    Carbon Dioxide 27.1 20 - 32 mmol/L    Creatinine 1.15 0.60 - 1.30 mg/dL    Glucose 90 60 - 99 mg/dL    Sodium 137.0 135 - 146 mmol/L    Potassium 4.68 3.5 - 5.3 mmol/L    Chloride 101.5 98 - 110 mmol/L    Protein Total 8.0 6.1 - 8.1 g/dL    Albumin 4.5 3.6 - 5.1 g/dL    Alkaline Phosphatase 97 33 - 130 U/L    ALT 70 (A) 0 - 32 U/L    AST 50 (A) 0 - 35 U/L    Bilirubin Total 1.2 0.2 - 1.2 mg/dL    Urea Nitrogen 27 (A) 7 - 25 mg/dL    Calcium 10.1 8.6 - 10.3 mg/dL    BUN/Creatinine Ratio 23.5 6 - 32    Globulin Calculated 3.5 1.9 - 3.7    A/G Ratio 1.3 1 - 2.5

## 2023-06-29 NOTE — NURSING NOTE
Chief Complaint   Patient presents with     Hospital F/U     Gall bladder removal on 6/22/23  [facility] in [place], [place]  6/20/2023 - 6/22/2023     Surgical Followup     Gall bladder removal on 6/22/23

## 2024-01-04 ENCOUNTER — OFFICE VISIT (OUTPATIENT)
Dept: FAMILY MEDICINE | Facility: CLINIC | Age: 64
End: 2024-01-04

## 2024-01-04 VITALS
BODY MASS INDEX: 30.47 KG/M2 | OXYGEN SATURATION: 94 % | TEMPERATURE: 97.9 F | DIASTOLIC BLOOD PRESSURE: 76 MMHG | HEART RATE: 64 BPM | WEIGHT: 196 LBS | SYSTOLIC BLOOD PRESSURE: 120 MMHG

## 2024-01-04 DIAGNOSIS — F41.1 GENERALIZED ANXIETY DISORDER: ICD-10-CM

## 2024-01-04 DIAGNOSIS — Z13.220 SCREENING FOR LIPID DISORDERS: Primary | ICD-10-CM

## 2024-01-04 DIAGNOSIS — I10 ESSENTIAL HYPERTENSION, BENIGN: ICD-10-CM

## 2024-01-04 DIAGNOSIS — I49.3 PVC'S (PREMATURE VENTRICULAR CONTRACTIONS): ICD-10-CM

## 2024-01-04 LAB
BUN SERPL-MCNC: 19 MG/DL (ref 7–25)
BUN/CREATININE RATIO: 17.4 (ref 6–32)
CALCIUM SERPL-MCNC: 9.3 MG/DL (ref 8.6–10.3)
CHLORIDE SERPLBLD-SCNC: 103.9 MMOL/L (ref 98–110)
CHOLEST SERPL-MCNC: 228 MG/DL (ref 0–199)
CHOLEST/HDLC SERPL: 4 {RATIO} (ref 0–5)
CO2 SERPL-SCNC: 31.1 MMOL/L (ref 20–32)
CREAT SERPL-MCNC: 1.09 MG/DL (ref 0.6–1.3)
GLUCOSE SERPL-MCNC: 93 MG/DL (ref 60–99)
HDLC SERPL-MCNC: 52 MG/DL (ref 40–150)
LDLC SERPL CALC-MCNC: 142 MG/DL (ref 0–130)
POTASSIUM SERPL-SCNC: 4.66 MMOL/L (ref 3.5–5.3)
SODIUM SERPL-SCNC: 140.8 MMOL/L (ref 135–146)
TRIGL SERPL-MCNC: 171 MG/DL (ref 0–149)

## 2024-01-04 PROCEDURE — 80061 LIPID PANEL: CPT | Performed by: PHYSICIAN ASSISTANT

## 2024-01-04 PROCEDURE — 90471 IMMUNIZATION ADMIN: CPT | Performed by: PHYSICIAN ASSISTANT

## 2024-01-04 PROCEDURE — 90686 IIV4 VACC NO PRSV 0.5 ML IM: CPT | Performed by: PHYSICIAN ASSISTANT

## 2024-01-04 PROCEDURE — 99213 OFFICE O/P EST LOW 20 MIN: CPT | Mod: 25 | Performed by: PHYSICIAN ASSISTANT

## 2024-01-04 PROCEDURE — 80048 BASIC METABOLIC PNL TOTAL CA: CPT | Performed by: PHYSICIAN ASSISTANT

## 2024-01-04 PROCEDURE — 36415 COLL VENOUS BLD VENIPUNCTURE: CPT | Performed by: PHYSICIAN ASSISTANT

## 2024-01-04 RX ORDER — SERTRALINE HYDROCHLORIDE 100 MG/1
100 TABLET, FILM COATED ORAL DAILY
Qty: 90 TABLET | Refills: 3 | Status: SHIPPED | OUTPATIENT
Start: 2024-01-04

## 2024-01-04 RX ORDER — LISINOPRIL AND HYDROCHLOROTHIAZIDE 12.5; 2 MG/1; MG/1
1 TABLET ORAL DAILY
Qty: 90 TABLET | Refills: 3 | Status: SHIPPED | OUTPATIENT
Start: 2024-01-04

## 2024-01-04 RX ORDER — METOPROLOL SUCCINATE 50 MG/1
50 TABLET, EXTENDED RELEASE ORAL DAILY
Qty: 90 TABLET | Refills: 3 | Status: SHIPPED | OUTPATIENT
Start: 2024-01-04

## 2024-01-04 ASSESSMENT — ANXIETY QUESTIONNAIRES
IF YOU CHECKED OFF ANY PROBLEMS ON THIS QUESTIONNAIRE, HOW DIFFICULT HAVE THESE PROBLEMS MADE IT FOR YOU TO DO YOUR WORK, TAKE CARE OF THINGS AT HOME, OR GET ALONG WITH OTHER PEOPLE: NOT DIFFICULT AT ALL
5. BEING SO RESTLESS THAT IT IS HARD TO SIT STILL: NOT AT ALL
GAD7 TOTAL SCORE: 0
1. FEELING NERVOUS, ANXIOUS, OR ON EDGE: NOT AT ALL
7. FEELING AFRAID AS IF SOMETHING AWFUL MIGHT HAPPEN: NOT AT ALL
GAD7 TOTAL SCORE: 0
2. NOT BEING ABLE TO STOP OR CONTROL WORRYING: NOT AT ALL
6. BECOMING EASILY ANNOYED OR IRRITABLE: NOT AT ALL
3. WORRYING TOO MUCH ABOUT DIFFERENT THINGS: NOT AT ALL

## 2024-01-04 ASSESSMENT — PATIENT HEALTH QUESTIONNAIRE - PHQ9
5. POOR APPETITE OR OVEREATING: NOT AT ALL
SUM OF ALL RESPONSES TO PHQ QUESTIONS 1-9: 3

## 2024-01-04 NOTE — NURSING NOTE
Chief Complaint   Patient presents with    Refill Request     Pt here for a medication refill    Recheck Medication    Pre-visit Screening:  Immunizations:  up to date  Colonoscopy:  is up to date  Mammogram: is up to date  Asthma Action Test/Plan:  na  PHQ9:  updated today  GAD7:  updated today  Questioned patient about current smoking habits Pt. has never smoked.  Ok to leave detailed message on voice mail for today's visit only yes, phone # [phone]

## 2024-01-04 NOTE — PROGRESS NOTES
CC: Medication Check      History:  HTN, PVCs:  Stable on lisinopril-hydrochlorothiazide, metoprolol XL. Taking consistently. No side effects. Does not check BP at home. No chest pain, palpitations, SOB.      Has not been particularly active, but weight is somewhat decreased. Meal planning. Hoping to be more active in [place].     Anxiety:  Taking sertraline 100 mg daily. Feels like this is working well for her. Has been better since retiring, but thinks this is the right dose as she still has times where anxiety is more active.    PMH, MEDICATIONS, ALLERGIES, SOCIAL AND FAMILY HISTORY in [facility] and reviewed by me personally.    ROS negative other than the symptoms noted above in the HPI.      Examination   /76 (BP Location: Right arm, Patient Position: Sitting, Cuff Size: Adult Large)   Pulse 64   Temp 97.9  F (36.6  C) (Temporal)   Wt 88.9 kg (196 lb)   LMP 04/20/2013   SpO2 94%   BMI 30.47 kg/m       Constitutional: Sitting comfortably, in no acute distress. Vital signs noted  Neck:  no adenopathy, trachea midline and normal to palpation, thyroid normal to palpation  Cardiovascular:  regular rate and rhythm, no murmurs, clicks, or gallops  Respiratory:  normal respiratory rate and rhythm, lungs clear to auscultation  SKIN: No jaundice/pallor/rash.   Psychiatric: mentation appears normal and affect normal/bright    A/P    ICD-10-CM    1. Essential hypertension, benign  I10       2. PVC's (premature ventricular contractions)  I49.3       3. Generalized anxiety disorder  F41.1           DISCUSSION:  HTN, PVCs:  BP controlled today. [facility] check fasting labs today [facility]. Will refill medication without change for 1 year.      Anxiety:  PHQ-9 and BRIAN-7 updated today and well controlled. Agreed to refill current medication without change for 1 year. Contact us sooner with concerns, worsening.    follow up visit: 1 year    Mari Mercer PA-C  [facility]

## 2024-06-01 ENCOUNTER — HEALTH MAINTENANCE LETTER (OUTPATIENT)
Age: 64
End: 2024-06-01

## 2024-08-13 ENCOUNTER — OFFICE VISIT (OUTPATIENT)
Dept: FAMILY MEDICINE | Facility: CLINIC | Age: 64
End: 2024-08-13

## 2024-08-13 VITALS
OXYGEN SATURATION: 92 % | WEIGHT: 195 LBS | HEIGHT: 67 IN | TEMPERATURE: 99.3 F | DIASTOLIC BLOOD PRESSURE: 80 MMHG | SYSTOLIC BLOOD PRESSURE: 118 MMHG | HEART RATE: 66 BPM | BODY MASS INDEX: 30.61 KG/M2

## 2024-08-13 DIAGNOSIS — K21.9 GASTROESOPHAGEAL REFLUX DISEASE WITHOUT ESOPHAGITIS: ICD-10-CM

## 2024-08-13 DIAGNOSIS — Z13.820 SCREENING FOR OSTEOPOROSIS: ICD-10-CM

## 2024-08-13 DIAGNOSIS — Z12.31 ENCOUNTER FOR SCREENING MAMMOGRAM FOR MALIGNANT NEOPLASM OF BREAST: ICD-10-CM

## 2024-08-13 DIAGNOSIS — Z13.228 SCREENING FOR METABOLIC DISORDER: ICD-10-CM

## 2024-08-13 DIAGNOSIS — Z00.00 ENCOUNTER FOR GENERAL HEALTH EXAMINATION: Primary | ICD-10-CM

## 2024-08-13 DIAGNOSIS — Z13.220 SCREENING FOR LIPID DISORDERS: ICD-10-CM

## 2024-08-13 PROCEDURE — 80061 LIPID PANEL: CPT | Performed by: PHYSICIAN ASSISTANT

## 2024-08-13 PROCEDURE — 99396 PREV VISIT EST AGE 40-64: CPT | Performed by: PHYSICIAN ASSISTANT

## 2024-08-13 PROCEDURE — 80048 BASIC METABOLIC PNL TOTAL CA: CPT | Performed by: PHYSICIAN ASSISTANT

## 2024-08-13 PROCEDURE — 36415 COLL VENOUS BLD VENIPUNCTURE: CPT | Performed by: PHYSICIAN ASSISTANT

## 2024-08-13 RX ORDER — OMEPRAZOLE 40 MG/1
40 CAPSULE, DELAYED RELEASE ORAL DAILY
Qty: 30 CAPSULE | Refills: 0 | Status: SHIPPED | OUTPATIENT
Start: 2024-08-13 | End: 2024-09-16

## 2024-08-13 NOTE — PROGRESS NOTES
Chief Complaint:  Physical Exam    SUBJECTIVE:   Ke Sloan is a 64 year old female presents for routine health maintenance.    Current concerns:   Chest tightness:   Ke will take her medication at night. Not every night, but about an hour later, usually after laying down, will start having a tightness and burning in chest followed by vomiting. Happens about half the nights. The tightness can persist through the day. No shortness of breath, but does feel she can notice more pronounced heart beat. Will get dizziness, lightheadedness, on rare occasion, but hasn't passed out. Also getting random episodes of coughing during the day. 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 Doesn't consume caffeine. Some alcohol socially up to 2 days per week. Avoids spicy. Will drink lemonade, water.     Menses are absent    Patient's last menstrual period was 04/20/2013.    Was last Pap smear normal: Yes  Due for mammogram:  Yes    Body mass index is 30.31 kg/m .    Present exercise habits:  Nothing formal  Present dietary habits:  eats regular meals and follows a balanced nutrition diet    Calcium intake: 1-2 servings.   Vit D intake: is taking supplement    Is the patient a smoker? No  If yes, smoking cessation advised and counseling provided.     Cardiovascular risk factors: hypertension and obesity    Over the past few weeks, have you felt down or depressed? Little interest or pleasure in doing things? No concerns. Anxiety seems controlled.     If in a relationship are there any Domestic violence concern: No    Last dental appointment:  this year  Last optical appointment:  this year    Was the patient born between [id number] and has not had Hep C testing?  Patient has already been tested    I have reviewed the following histories: Past Medical History, Past Surgical History, Social History, Family History, Problem List, Medication List and Allergies    Past Medical History:   Diagnosis  Date    MIGRAINE NOS W/O MENTN INTRACTABLE 7/21/2002     Family History   Problem Relation Age of Onset    Hypertension Mother     Diabetes Mother     Respiratory Mother         emphysema    Anxiety Disorder Mother     Hypertension Father     Cerebrovascular Disease Father         60 YEARS OLD WHEN HE HAD A CVA    Sleep Apnea Father     Diabetes Father     Kidney Cancer Father     Thyroid Disease Sister     Diabetes Sister     Thyroid Cancer Sister         partial thyroidectomy    Hypertension Sister     Anxiety Disorder Sister     Diabetes Maternal Grandmother         INSULIN    Respiratory Paternal Grandfather         EMPHYSEMA    Aortic Valve Replacement Daughter         aortic valve stenosis, bicuspid    Osteoporosis No family hx of      Social History     Socioeconomic History    Marital status:      Spouse name: Not on file    Number of children: Not on file    Years of education: Not on file    Highest education level: Not on file   Occupational History    Not on file   Tobacco Use    Smoking status: Never     Passive exposure: Past    Smokeless tobacco: Never   Substance and Sexual Activity    Alcohol use: Yes     Alcohol/week: 0.8 standard drinks of alcohol     Types: 1 Standard drinks or equivalent per week     Comment: weekends    Drug use: No    Sexual activity: Yes     Partners: Male   Other Topics Concern    Not on file   Social History Narrative    Not on file     Social Determinants of Health     Financial Resource Strain: Not on file   Food Insecurity: Not on file   Transportation Needs: Not on file   Physical Activity: Not on file   Stress: Not on file   Social Connections: Not on file   Interpersonal Safety: Not on file   Housing Stability: Not on file     Past Surgical History:   Procedure Laterality Date    CHOLECYSTECTOMY  06/2023    COLONOSCOPY  06/01/2011    Normal/ rpt in 10 yrs    HC PTA RENAL/VISCERAL ARTERY  01/01/2008    fibromuscular dysplasia, renal    RETINAL REATTACHMENT Right  09/2020    [facility] LIGATE FALLOPIAN TUBE      AGE 32    [facility] NONSPECIFIC PROCEDURE  01/01/1998    LASIK EYE SURGERY       ROS:  E/M: NEGATIVE for ear, nose, mouth and throat problems  R: NEGATIVE for significant/chronic cough or SOB  CV: NEGATIVE for chest pain or palpitations  GI: NEGATIVE for abdominal pain, chronic diarrhea or constipation  :  NEGATIVE for dysuria, hematuria or vaginal discharge. No sexual health concerns.       Current Outpatient Medications   Medication Sig Dispense Refill    lisinopril-hydrochlorothiazide (ZESTORETIC) 20-12.5 MG tablet Take 1 tablet by mouth daily 90 tablet 3    metoprolol succinate ER (TOPROL XL) 50 MG 24 hr tablet Take 1 tablet (50 mg) by mouth daily 90 tablet 3    olopatadine (PATADAY) 0.2 % ophthalmic solution Place 1 drop into both eyes daily 2.5 mL 3    omeprazole (PRILOSEC) 40 MG DR capsule Take 1 capsule (40 mg) by mouth daily - 30-60 minutes before 1st meal 30 capsule 0    sertraline (ZOLOFT) 100 MG tablet Take 1 tablet (100 mg) by mouth daily 90 tablet 3     No current facility-administered medications for this visit.       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  Ke Sloan has no plan or code status on file however states presence of ACP document. Copy requested.   Added by Melanie Johnson                    Essential hypertension, benign 01/06/2008     Priority: Medium    Other chronic allergic conjunctivitis 07/21/2002  "    Priority: Medium         OBJECTIVE:  /80 (BP Location: Right arm, Patient Position: Sitting, Cuff Size: Adult Large)   Pulse 66   Temp 99.3  F (37.4  C) (Temporal)   Ht 1.708 m (5' 7.25\")   Wt 88.5 kg (195 lb)   LMP 04/20/2013   SpO2 92%   BMI 30.31 kg/m      General: 64 year old female who appears her stated age. Vital signs noted.  Head: Normocephalic  Eyes: pupils equal round reactive to light and accomodation, extra ocular movements intact  Ears: external canals and TMs free of abnormalities  Nose: patent, without mucosal abnormalities  Mouth and throat: without erythema or lesions of the mucosa  Neck: supple, without adenopathy or thyromegaly  Lungs: clear to auscultation, no wheezing or crackles  Breasts: Declined. No concerns  Cv: regular rate and rhythm, normal s1 and s2 without murmur or click  Abd: soft, non-tender, no masses, no hepatomegaly or splenomegaly.   (female): Declined. No concerns.   Ms: normal muscle tone & symmetry  Skin: clear to inspection and with no palpable abnormalities.  Neuro: sensation and motor function grossly intact; cranial nerves without obvious abnormalities.    ASSESSMENT/PLAN:    Encounter for general health examinationGastroesophageal reflux disease without esophagitis    Ke is doing relatively well today. Will update fasting labs, ordered updated mammogram, as well as baseline dexa scan. Will contact with results when available. Did spend time discussing her chest burning, tightness, worst with laying down. Considered cardiac, pulmonary etiologies, but ultimately suspect this is GERD in setting of worsening lifestyle. Recommended trial of omeprazole 40 mg daily 30-60 minutes before eating for 14-30 days. Contact me in 2 weeks if symptoms not resolved or sooner if worsening. ER with any significant worsening for more immediate evaluation.   - omeprazole (PRILOSEC) 40 MG DR capsule; Take 1 capsule (40 mg) by mouth daily - 30-60 minutes before 1st " "meal    Screening for metabolic disorder  - VENOUS COLLECTION  - Basic Metabolic Panel (BFP)    Screening for lipid disorders  - VENOUS COLLECTION  - Lipid Panel (BFP)    Encounter for screening mammogram for malignant neoplasm of breast  - Radiology Referral (Affiliate Use Only)  - MA Screening Bilateral w/ Gil    Screening for osteoporosis  - DX Bone Density  - Radiology Referral (Affiliate Use Only)         reports that she has never smoked. She has been exposed to tobacco smoke. She has never used smokeless tobacco.    Estimated body mass index is 30.31 kg/m  as calculated from the following:    Height as of this encounter: 1.708 m (5' 7.25\").    Weight as of this encounter: 88.5 kg (195 lb).  Weight management plan: Discussed healthy diet and exercise guidelines    Labs pending:      Fasting glucose      Fasting lipids  Meds Suggested:      Vitamin D       Calcium  Tests Recommended:      Regular Dental Examinations        Eye exam        Mammogram yearly  Behavior Modifications:       Cardiovascular exercise 3 times per week--enough to get your Target Heart rate  Other recommendations:     BMI noted and discussed      Regular breast exam     Encouraged My Chart    Counseling Resources:  ATP IV Guidelines  Pooled Cohorts Equation Calculator  Breast Cancer Risk Calculator  FRAX Risk Assessment  ICSI Preventive Guidelines  Dietary Guidelines for Americans, 2010  "[employer]'s MyPlate      Mari Mercer PA-C  8/13/2024    "

## 2024-08-13 NOTE — NURSING NOTE
Chief Complaint   Patient presents with    Physical     Annual, fasting, blood pressure check with some recent episodes of tightness in chest     Pre-visit Screening:  Immunizations:  Not applicable  Colonoscopy:  is up to date  Mammogram: is up to date  Asthma Action Test/Plan:  na  PHQ9:  na  GAD7:  na  Questioned patient about current smoking habits Pt. has never smoked.  Ok to leave detailed message on voice mail for today's visit only yes, phone # [phone] (home)

## 2024-08-14 LAB
BUN SERPL-MCNC: 22 MG/DL (ref 7–25)
BUN/CREATININE RATIO: 19 (ref 6–32)
CALCIUM SERPL-MCNC: 10.1 MG/DL (ref 8.6–10.3)
CHLORIDE SERPLBLD-SCNC: 101.6 MMOL/L (ref 98–110)
CHOLEST SERPL-MCNC: 196 MG/DL (ref 0–199)
CHOLEST/HDLC SERPL: 4 {RATIO} (ref 0–5)
CO2 SERPL-SCNC: 29.2 MMOL/L (ref 20–32)
CREAT SERPL-MCNC: 1.18 MG/DL (ref 0.6–1.3)
GLUCOSE SERPL-MCNC: 93 MG/DL (ref 60–99)
HDLC SERPL-MCNC: 49 MG/DL (ref 40–150)
LDLC SERPL CALC-MCNC: 123 MG/DL
POTASSIUM SERPL-SCNC: 4.19 MMOL/L (ref 3.5–5.3)
SODIUM SERPL-SCNC: 138.2 MMOL/L (ref 135–146)
TRIGL SERPL-MCNC: 120 MG/DL (ref 0–149)

## 2024-08-28 LAB — MAMMOGRAM: NORMAL

## 2024-08-31 ENCOUNTER — MYC MEDICAL ADVICE (OUTPATIENT)
Dept: FAMILY MEDICINE | Facility: CLINIC | Age: 64
End: 2024-08-31

## 2024-09-15 ENCOUNTER — MYC MEDICAL ADVICE (OUTPATIENT)
Dept: FAMILY MEDICINE | Facility: CLINIC | Age: 64
End: 2024-09-15

## 2024-09-15 DIAGNOSIS — K21.9 GASTROESOPHAGEAL REFLUX DISEASE WITHOUT ESOPHAGITIS: ICD-10-CM

## 2024-09-16 RX ORDER — OMEPRAZOLE 40 MG/1
CAPSULE, DELAYED RELEASE ORAL
Qty: 30 CAPSULE | Refills: 0 | OUTPATIENT
Start: 2024-09-16

## 2024-09-17 RX ORDER — OMEPRAZOLE 40 MG/1
40 CAPSULE, DELAYED RELEASE ORAL DAILY
Qty: 90 CAPSULE | Refills: 3 | Status: SHIPPED | OUTPATIENT
Start: 2024-09-17

## 2024-12-04 ENCOUNTER — OFFICE VISIT (OUTPATIENT)
Dept: URGENT CARE | Facility: URGENT CARE | Age: 64
End: 2024-12-04

## 2024-12-04 ENCOUNTER — ANCILLARY PROCEDURE (OUTPATIENT)
Dept: GENERAL RADIOLOGY | Facility: CLINIC | Age: 64
End: 2024-12-04
Attending: FAMILY MEDICINE

## 2024-12-04 VITALS
RESPIRATION RATE: 97 BRPM | HEART RATE: 80 BPM | WEIGHT: 194 LBS | BODY MASS INDEX: 30.16 KG/M2 | TEMPERATURE: 99.1 F | SYSTOLIC BLOOD PRESSURE: 120 MMHG | DIASTOLIC BLOOD PRESSURE: 92 MMHG

## 2024-12-04 DIAGNOSIS — S61.210A LACERATION OF RIGHT INDEX FINGER WITHOUT FOREIGN BODY WITHOUT DAMAGE TO NAIL, INITIAL ENCOUNTER: Primary | ICD-10-CM

## 2024-12-04 DIAGNOSIS — S67.198A CRUSHING INJURY OF INDEX FINGER: ICD-10-CM

## 2024-12-04 PROCEDURE — 12001 RPR S/N/AX/GEN/TRNK 2.5CM/<: CPT | Performed by: FAMILY MEDICINE

## 2024-12-04 PROCEDURE — 99213 OFFICE O/P EST LOW 20 MIN: CPT | Mod: 25 | Performed by: FAMILY MEDICINE

## 2024-12-04 PROCEDURE — 73130 X-RAY EXAM OF HAND: CPT | Mod: TC | Performed by: RADIOLOGY
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
## 2024-12-05 NOTE — PATIENT INSTRUCTIONS
Sutures should be removed in 7 days.  Today we placed 4 sutures in total.    Watch for signs of infection and return if you see a lot of redness, pus draining, or red streaks forming.

## 2025-01-03 DIAGNOSIS — I10 ESSENTIAL HYPERTENSION, BENIGN: ICD-10-CM

## 2025-01-03 DIAGNOSIS — I49.3 PVC'S (PREMATURE VENTRICULAR CONTRACTIONS): ICD-10-CM

## 2025-01-03 DIAGNOSIS — F41.1 GENERALIZED ANXIETY DISORDER: ICD-10-CM

## 2025-01-06 NOTE — TELEPHONE ENCOUNTER
Pending Prescriptions:                       Disp   Refills    sertraline (ZOLOFT) 100 MG tablet [Pharma*90 tab*             Sig: TAKE 1 TABLET(100 MG) BY MOUTH DAILY    lisinopril-hydrochlorothiazide (ZESTORETI*90 tab*             Sig: TAKE 1 TABLET BY MOUTH DAILY    metoprolol succinate ER (TOPROL XL) 50 MG*90 tab*             Sig: TAKE 1 TABLET(50 MG) BY MOUTH DAILY    SRB please review:    Last refill was 1-4-24  Last px was 8-13-24  Please fax deny close encounter or advise  Angelina

## 2025-01-07 ENCOUNTER — MYC REFILL (OUTPATIENT)
Dept: FAMILY MEDICINE | Facility: CLINIC | Age: 65
End: 2025-01-07

## 2025-01-07 DIAGNOSIS — I49.3 PVC'S (PREMATURE VENTRICULAR CONTRACTIONS): ICD-10-CM

## 2025-01-07 DIAGNOSIS — F41.1 GENERALIZED ANXIETY DISORDER: ICD-10-CM

## 2025-01-07 DIAGNOSIS — I10 ESSENTIAL HYPERTENSION, BENIGN: ICD-10-CM

## 2025-01-07 RX ORDER — METOPROLOL SUCCINATE 50 MG/1
50 TABLET, EXTENDED RELEASE ORAL DAILY
Qty: 90 TABLET | Refills: 1 | Status: SHIPPED | OUTPATIENT
Start: 2025-01-07

## 2025-01-07 RX ORDER — SERTRALINE HYDROCHLORIDE 100 MG/1
100 TABLET, FILM COATED ORAL DAILY
Qty: 90 TABLET | OUTPATIENT
Start: 2025-01-07

## 2025-01-07 RX ORDER — LISINOPRIL AND HYDROCHLOROTHIAZIDE 12.5; 2 MG/1; MG/1
1 TABLET ORAL DAILY
Qty: 90 TABLET | OUTPATIENT
Start: 2025-01-07

## 2025-01-07 RX ORDER — LISINOPRIL AND HYDROCHLOROTHIAZIDE 12.5; 2 MG/1; MG/1
1 TABLET ORAL DAILY
Qty: 90 TABLET | Refills: 1 | Status: SHIPPED | OUTPATIENT
Start: 2025-01-07

## 2025-01-07 RX ORDER — SERTRALINE HYDROCHLORIDE 100 MG/1
100 TABLET, FILM COATED ORAL DAILY
Qty: 90 TABLET | Refills: 1 | Status: SHIPPED | OUTPATIENT
Start: 2025-01-07

## 2025-01-07 RX ORDER — METOPROLOL SUCCINATE 50 MG/1
50 TABLET, EXTENDED RELEASE ORAL DAILY
Qty: 90 TABLET | OUTPATIENT
Start: 2025-01-07

## 2025-01-07 NOTE — TELEPHONE ENCOUNTER
Patient is requesting refills of  Pending Prescriptions:                       Disp   Refills    lisinopril-hydrochlorothiazide (ZESTORETI*90 tab*3            Sig: Take 1 tablet by mouth daily.    metoprolol succinate ER (TOPROL XL) 50 MG*90 tab*3            Sig: Take 1 tablet (50 mg) by mouth daily.    sertraline (ZOLOFT) 100 MG tablet         90 tab*3            Sig: Take 1 tablet (100 mg) by mouth daily.    Routing to [facility] for review, last refills given January 2024 but patient was seen for physical on 8/13/24, do you want patient to come back in for a medication check visit?

## 2025-01-08 NOTE — TELEPHONE ENCOUNTER
Medications are stable for pt. She had labs with px 8/2024. Refilled 6 months. Ideally would follow-up 8/2025 or sooner when she is back from [place]. Could do short extension to 8/2025 if needed once scheduled.

## 2025-03-05 ENCOUNTER — MYC MEDICAL ADVICE (OUTPATIENT)
Dept: FAMILY MEDICINE | Facility: CLINIC | Age: 65
End: 2025-03-05

## 2025-04-22 NOTE — PROGRESS NOTES
"  [place] Study Physical Exam  Study Description: The purpose of this study is to explore potential relationships between physiologic parameters collected from sensor data with physiological changes potentially induced by the administration of the COVID-19 vaccine.     Medical History Reviewed? Yes  After extensive review of the entire available medical record, to the best of my knowledge there is no reason to exclude this patient from the study.     Medical Decision Making (include details from chart review, discussion with Dr. JESSICA, etc): N/A    Physical Examination  For abnormal findings, please evaluate if the finding is Clinically Significant (by 'CS') or Not Clinically Significant (by 'NCS')  General Appearance   Normal  Head and Neck   Normal  Lungs     Normal  Cardiovascular   Normal   Do they have a stimulator/Pacemaker? No  Gastrointestinal   Normal   Problems swallowing medication? No  ANY history of diverticula (diverticulosis, diverticulitis, etc): No  Any history of GI surgery? No  Bowel habits: 5 days out of 7 days has a bowel movement    Regular laxative use? No  Musculoskeletal/Extremities Normal   Lymph Nodes    Normal  Skin     Normal     Any Tattoos or Skin issues on the wrists or deltoid? No  Neurological    Normal   Any sleep disturbances? (Must get at least 5 hours a night) No Patient sleeps about 7 hours    Memory issues?  No    Tremor (If present document)  Absent  Any balance issues or recent falls?     No    Vitals:    04/30/25 0915   BP: 126/85   BP Location: Left arm   Patient Position: Sitting   Cuff Size: Adult Regular   Pulse: 67   Resp: 15   SpO2: 97%   Weight: 88 kg (194 lb)   Height: 1.702 m (5' 7\")           Immunization History   Administered Date(s) Administered    COVID-19 Monovalent 18+ (Moderna) 04/01/2021, 04/29/2021, 11/29/2021    HEPA 02/11/2004, 03/16/2005    Hepatitis A (VAQTA)(ADULT 19+) 02/11/2004, 03/16/2005    Influenza (IIV3) PF 11/05/2008, 10/29/2010    Influenza " Vaccine >6 months,quad, PF 11/13/2015, 12/07/2016, 01/06/2020, 12/08/2020, 01/04/2022, 12/13/2022, 01/04/2024    TD,PF 7+ (Tenivac) 07/01/2000, 07/08/2020    TDAP Vaccine (Boostrix) 04/21/2010    Zoster recombinant adjuvanted (Shingrix) 03/05/2020, 07/27/2020   Reminders:  Are they using prescription pain meds? No  Any first degree relatives with inflammatory bowel disease? (Crohn's, ulcerative colitis, etc) No  Any serious medical issues that require treatment and evaluation? No  Any conditions they are following closely with their PCP? No  Have you had any serious issues with previous Covid-19 immunizations? No  COVID Vaccine Screening   Have you received a dose of the Covid-19 vaccine before?   Yes, Moderna  Date of most recent Covid-19 vaccine dose:     29-November-2021   Do you currently have a health condition or are undergoing    treatment that makes you moderately to severely immunocompromised?* No  Have you ever had an allergic reaction to a Covid vaccine?**  No  Have you ever had an allergic reaction to another vaccine or injectable  medication?         No  Have you ever felt dizzy or faint before, during or after a shot?   No    *Ex: treatment of cancer, HIV, organ transplant recipient, immunosuppressive therapy, etc.     **This would include a severe allergic reaction (e.g., anaphylaxis that required treatment with epinephrine or caused you to go to the hospital. It would also include an allergic reaction that caused hives, swelling, or respiratory distress, including wheezing)    Is this subject eligible to receive a Covid-19 vaccine? Yes    Patient does fulfill study inclusion criteria and no exclusion criteria are found. Subject will continue in the study. This decision was made at 9:39    30-APR-2025    Basia Nicholas NP

## 2025-04-30 ENCOUNTER — ALLIED HEALTH/NURSE VISIT (OUTPATIENT)
Dept: RESEARCH | Facility: CLINIC | Age: 65
End: 2025-04-30

## 2025-04-30 VITALS
HEART RATE: 67 BPM | WEIGHT: 194 LBS | RESPIRATION RATE: 15 BRPM | HEIGHT: 67 IN | SYSTOLIC BLOOD PRESSURE: 126 MMHG | OXYGEN SATURATION: 97 % | DIASTOLIC BLOOD PRESSURE: 85 MMHG | BODY MASS INDEX: 30.45 KG/M2

## 2025-04-30 DIAGNOSIS — Z00.6 EXAMINATION OF PARTICIPANT OR CONTROL IN CLINICAL RESEARCH: Primary | ICD-10-CM

## 2025-04-30 PROCEDURE — 3074F SYST BP LT 130 MM HG: CPT

## 2025-04-30 PROCEDURE — 99207 PR NO CHARGE-RESEARCH SERVICE: CPT

## 2025-04-30 PROCEDURE — 3079F DIAST BP 80-89 MM HG: CPT

## 2025-04-30 NOTE — PROGRESS NOTES
[place] Screening Study Note  Study Description: The purpose of this study is to explore potential relationships between physiologic parameters collected from sensor data with physiological changes potentially induced by the administration of the COVID-19 vaccine.       Subject ID:      Demographic Info  Ke Sloan   1960          64 year old    SCREENING   Sex: Female   Pregnancy Screening:   Surgically Sterile: No  Over 55 years of age and have not had a menstrual cycle for >2 years: Yes Where is the patient located?  Last Menstrual Period Date: 20-April-2013          Multi Racial?: No; Primary: White   Ethnicity: Not  or      MEDICAL HISTORY REVIEW  Medical history, medications, allergies, surgical history and familial medical history were reviewed with the participant and verified by chart review.     Medical Conditions:   The table below represents their relevant medical history.   Has the subject experienced any relevant past and/or concomitant Medical History (e.g., chronic conditions such as hypertension, cardiovascular disease, stroke, diabetes, kidney disease, peripheral arterial disease, Raynaud's syndrome? Yes      Condition Ongoing? Start Date (MM/DD/YYYY) End Date (if applicable)   Essential Hypertension Yes JAN-6-2008 Not Applicable   Benign Premature Ventricular Contractions (PVCs) Yes NOV- Not Applicable   Gastroesophageal reflux disease Yes SEP- Not Applicable   Generalized anxiety disorder Yes JUL- Not Applicable   Obstructive Sleep Apnea Yes AUG-1-2017 Not Applicable       Any History of...  If any of the below are yes, the subject is a screen fail.  -Divertic___ (diverticulosis, diverticula, diverticulitis, etc.)? No  -Rheumatoid arthritis? No  Lupus? No  Hernia? (Other than inguinal or childhood umbilical)  No  Peptic Ulcer? No  Crohn's Disease? No  In any 1st degree family members? No  Colitis? No  Dysphagia? No  Parkinson s? No  Essential  "Tremor? No      Concomitant Medications:   The table below represents their current prescription, OTC, and supplement medications they are taking on a regular basis/have taken in the last 30 days.          Medication Name (Generic) Class Start Date (MM/DD/YYYY) Ongoing? Dose Unit Frequency Route Indication   Lisinopril-hydrochlorothiazide  Other NOV- Yes 20-12.5  mg QD Oral Con Med Cond: Hypertension   metoprolol succinate ER  Beta-Blocker NOV- Yes 50 mg QD Oral Con Med Cond: Premature ventricular contractions   omeprazole  Other SEP- Yes 40 mg QD Oral Con Med Cond: Gastroesophageal reflux disease   sertraline Other JUL- Yes 100 mg QD Oral Con Med Cond: Anxiety       Allergies:   Does the subject have any known allergies to medications, food, a vaccine component, or latex? Yes  *If the participant has an allergy to something in not one of these 3 categories, include them in the medical history.*  Allergies   Allergen Reactions    Sulfa Antibiotics         Surgical History  Any history of gastrointestinal tract surgery involving the esophagus, stomach, or intestines? No  If there are no GI surgeries, delete surgery smartlist section.       Family Medical History  No first degree relative has a history of any inflammatory bowel disease with suspected hereditary transmission (eg. Crohn's, ulcerative colitis, etc)       Subject Characteristics     Vitals  /85 (BP Location: Left arm, Patient Position: Sitting, Cuff Size: Adult Regular)   Pulse 67   Resp 15   Ht 1.702 m (5' 7\")   Wt 88 kg (194 lb)   LMP 04/20/2013   SpO2 97%   BMI 30.38 kg/m         Albert Scale:  Wrist Circumference: Study Watch Wrist Preferred Watch Wrist Dominant Hand Watch Band Tightness:   4 15 cm Left Left Right Natural      Watch Size Preference: 41mm    ENROLLMENT    Was the visit performed? Yes   Date of Enrollment: 30-APR-2025. All procedures below occurred on this day.    Site Zip Code: Site Time " Zone:  Site Location: Protocol Assigned: Eligibility Confirmed:   [address] [address] [facility] Protocol A (COVID) Yes   Plan for Study Kit #1 Delivery: Given to Participant On-Site     [place] Device Accountability Prepped/Dispensed  Prepped & Dispensed Study Kit #1:  All Study Devices included? Yes (including Study Watch, Study Phone, Ambient Sensor, Oral Thermometer and Charging Accessories)  Is this a Replacement Kit? No    Study Phone  ID HSA Research ID [place] Nini ID    [id number]     Study Watch  ID Model  Band Type    [id number] Series 9 Sport Loop     Was Study Kit #1 Shipped to the Participant? No  Were all expected devices received? Yes  Were there Device Issues? No  Was the Study Kit Replaced? No    All devices listed above were dispensed to the participant. Device ID were confirmed prior to dispensation.      Participant was thoroughly educated on study procedure and device care, staff highlighted the importance of compliance to study procedure. All questions and concerns were addressed, and informed participant to contact study coordinators for any questions. Subject was provided with a copy of the subject instructions for at home review.     Adverse Events Summary:*   Were any Adverse Events (AEs) experienced? No    Protocol Deviation Summary:*   Were there any Protocol Deviations?: No    *If Yes, please complete corresponding form.    Concomitant Medications:  As screening and enrollment appointments occurred on the same day, please refer to the concomitant medications documented above.        30-APR-2025   JAMA Gutierrez

## 2025-04-30 NOTE — PROGRESS NOTES
[place] Study Consent    Study Description: The purpose of this study is to explore potential relationships between physiologic parameters collected from sensor data with physiological changes potentially induced by the administration of the COVID-19 vaccine.    Ke Sloan a 64 year old female, was on-site today at [facility] to discuss participation for [place] (-[id number])       The consent form was reviewed with the patient.     The review of the study included:  Study Purpose      Participant Duration, Responsibilities & Expectations    Study Data and Devices    Benefits and Risks of Participation    Compensation and Costs of Participation    Coded Study Data  Voluntary Participation    Study Restrictions  Confidentiality Obligations/Privacy-Related Risks   Injury, Legal, and Data Rights    Authorization to Use and Disclose Your Protected Health Information    Protocol Version: 6.0   Principle Investigator: Kaveh Colmenares MD    Subject Number: [id number]      The subject was queried in regards to her willingness to continue and her questions were answered to her satisfaction. The patient has given her agreement to volunteer and participate in the above noted study.     The eConsent and HIPAA form version (Version 6.0 Date 03-Apr-2025) was signed on  30-APR-2025 with the Clinical Research Unit of [facility].     A copy of the [place] consent will be placed in subject's medical record. A copy of the consent form was provided to the subject today.    Study data is directly entered into Epic and [employer] per protocol. No study procedures were done prior to Ke Sloan providing informed consent.       30-APR-2025    JAMA Gutierrez

## 2025-04-30 NOTE — PROGRESS NOTES
[place] Inclusion/Exclusion Criteria:    Study Name: [place] (-[id number])      : Kaveh Colmenares MD      Study Description: The purpose of this study is to explore potential relationships between physiologic parameters collected from sensor data with physiological changes potentially induced by the administration of the COVID-19 vaccine.     Protocol Version: 6.0 (Version Date: 2-APR-2025) Consent Version: 6.0 (Version Date: 3-APR-2025)  The protocol and consent form were consulted to make inclusion and exclusion decisions along with the gualberto dated 27-February-2025 that addresses all [place] Phase 1.0 nuances and provides further clarification for some inclusion/exclusion criteria.     Inclusion #  Inclusion Criteria (ALL MUST BE YES)  YES/NO/N/A   1 Be at least 18 years old  Yes   2 Proficient in written and spoken English, defined by self-report   Yes   3 Willing and able to participate in the study procedures and data consent described in the consent form   Yes   4 Able to communicate effectively with and follow instructions from the Study Team    Yes   5   Eligible to receive the updated COVID-19 vaccine based on current CDC recommendations and vaccine prescribing information. (As determined by Sub-I) Yes   6   Able to disclose home address to a healthcare provider or Study Team member to enable (a) device shipping (if necessary) and (b) a 911 call in case of potential emergency (home address will not be kept as study data)  Yes   7    Able to adhere to Lifestyle Considerations (see Section 5.3) throughout study duration (as applicable). These include avoiding taking certain over-the-counter pain relievers or fever reducing medications around the time of vaccination, not taking any recreational drugs (e.g. methamphetamines, cocaine, opioids, cannabis, LSD)  within 72 hours prior to, during and after the ingestible temperature sensor monitoring period; and abstaining from strenuous  "exercise, ingestion of hot or cold liquids, eating food, chewing gum or mints, brushing teeth or smoking 30 minutes before taking oral temperature measurements.  Yes   8   Participant has their own reliable high-speed broadband internet at their home and active at the time of data collection  Yes   9   Have a personal computer, desktop, laptop, tablet, or smartphone with audiovisual capabilities Yes   If any inclusion criteria marked \"No\" please provide detail (If all Yes, N/A): N/A        Exclusion # Exclusion Criteria (ALL MUST BE NO) YES/NO/N/A   1 Participants with tattoos, skin problems or wound(s) on/in the wrist or deltoid (ex: injured or friable skin, skin disorders, or allergic skin reactions, such as eczema, rosacea, impetigo, dermatomyositis, or allergic contact dermatitis), that can interfere with study setup/assessments/vaccination  No   2 Individuals who are pregnant or plan to become pregnant during the study  No   3 Anything that may interfere with proper physiological data acquisition, such as an implantable device (e.g., cardiac pacemaker, automated implantable cardioverter-defibrillator, deep brain stimulator, Inspire upper airway stimulation device) and casts or body braces No   4 Participants that are diagnosed or are suspected to have illnesses affecting motion: e.g., Parkinson's, Essential Tremor, Dystonia, or others at investigator's discretion No   5 Participants that are diagnosed with a condition or taking a medication that impairs the immune system (i.e., active cancer, HIV/AIDS, organ/stem cell transplant recipient, autoimmune disorders, primary immunodeficiencies) No   6 Participants with any medical history, vital sign, or any other study procedure finding/assessment that in the opinion of the investigator could compromise participant safety during study participation or interfere with the study integrity and/or the accurate assessment of the study objectives No   7 Daily use of OTC or " "prescription medications with antipyretic properties at time of enrollment that is anticipated to continue during the CBT sensor data collection period surrounding administration of vaccines. Low dose aspirin (81 mg or less per day) taken for preventative purposes is permissible No   8 Individuals who are unwilling to avoid taking OTC pain relievers and anti-pyrectics for acute mild to moderate pain and fever associated with vaccine administration during the data collection days surrounding its administration No   9 Participant works for a company that develops or sells medical and/or fitness devices (e.g., ECG monitors, wearable fitness bands, sleep monitors, etc.) or are technology journalists (e.g., professional bloggers, TV, magazine, newspaper reporters, etc.) No   10 Overnight travel or travel between time zones planned during CBT sensor data collection nights No   11 Participants with planned overnight travel totaling >= 7 nights during duration of study data collection period No   12 Participant plans on moving or changing address within the study period No   13 Participant is employed in overnight shift work, or otherwise does not maintain a reasonably consistent day/night schedule (e.g., participants who are unable to regularly go to bed between 7pm to 2am and wake up between 4am to 12pm on average >= 3 times a week) No   14 Participants with clinically relevant sleep disturbances and unable to achieve at least 4 hours of continuous sleep on average each night No   If any exclusion criteria marked \"Yes\" please provide detail (If all No, N/A): N/A    Exclusion (a) # Exclusion criteria related to the COVID-19 vaccine:   (ALL MUST BE NO) YES/NO/N/A   1 Participants with a known history of a severe allergic reaction (e.g., anaphylaxis) to any component of the COVID-19 vaccine. No   2 Participants who experienced severe side effects following previous administration of the COVID-19 vaccine including " "myocarditis, pericarditis, thrombosis, or thrombocytopenia No   3 Participants in whom an additional COVID-19 vaccine is contraindicated. No   If any exclusion criteria marked \"Yes\" please provide detail (If all No, N/A): N/A    Exclusion (b) # Exclusion criteria related to Ingestible Temperature Sensor:   (ALL MUST BE NO) YES/NO/N/A   1 Participants under the age of 18 No   2 Participant weighs less than 40 kg (88 lbs.) or BMI greater than 44.6 No   3 Participants who are pregnant No   4 Participants with a known diagnosis of obstructive disease of the gastrointestinal tract or a known hernia, Crohn's disease, diverticulitis, or other inflammatory bowel disease. No   5 Participants with a 1st degree relative with any inflammatory bowel disease with suspected hereditary transmission (e.g., Crohn's disease, or ulcerative colitis) No   6 Participants with known history of disordered or impaired gag reflex  No   7 Participants who have problems swallowing food or pills (e.g., dysphagia) No   8 Participants with previous gastrointestinal tract surgery involving the esophagus, stomach, or intestines, excluding intraluminal endoscopy. No   9 Participants with known diagnosis of hypo-motility disorders of the gastrointestinal tract (including chronic constipation with fewer than three spontaneous bowel movements per week No   10 Participants with chronic diarrhea, as defined by 3 or more episodes of diarrhea per week for the last 30 days or >= 3 bowel movements per day No   11 Participants with known diagnosis of felinization of the esophagus (unusual folding of the esophagus)  No   12 Participants with Zenker's diverticulum (a pouch that forms in the upper part of the esophagus) and people with a history of other diverticula.  No   13 Participants who may undergo NMR or MRI scanning within one week of CBT sensor ingestion No   14 Participants with an implantable pulse generator or implantable electro-medical device of any " "kind (e.g., pacemakers (or implantable pulse generators), implantable cardioverter defibrillators (ICDs), deep brain stimulation (DBS) devices, and left ventricular assist devices (LVADs). No   15 Participants with an implanted or temporarily implanted device that uses an external power-source. No   If any exclusion criteria marked \"Yes\" please provide detail (If all No, N/A): N/A    Will the participant continue in the study? Yes  (If \"No\", follow instructions for handling of Screen Failures)    If the participant is eligible to continue in the study, inclusion/exclusion criteria above will be sent to the PI for co-sign.    Enrollment Date:  30-APR-2025      MD Mari Hale EP   "

## 2025-05-19 ENCOUNTER — ALLIED HEALTH/NURSE VISIT (OUTPATIENT)
Dept: RESEARCH | Facility: CLINIC | Age: 65
End: 2025-05-19
Payer: COMMERCIAL

## 2025-05-19 DIAGNOSIS — Z00.6 EXAMINATION OF PARTICIPANT OR CONTROL IN CLINICAL RESEARCH: Primary | ICD-10-CM

## 2025-05-19 PROCEDURE — 99207 PR NO CHARGE-RESEARCH SERVICE: CPT

## 2025-05-19 NOTE — PROGRESS NOTES
[place] Pill Witness Study Note  Study Description: The purpose of this study is to explore potential relationships between physiologic parameters collected from sensor data with physiological changes potentially induced by the administration of the COVID-19 vaccine.       I supervised while Ke Sloan ingested the smart sensor. Smart sensor was swallowed without complication. Participant felt well after and was able to proceed with study procedures.       19-MAY-2025     Vianca Masterson PA-C

## 2025-05-19 NOTE — PROGRESS NOTES
[place] Pill Day / On-Site 1 Note  Study Description: The purpose of this study is to explore potential relationships between physiologic parameters collected from sensor data with physiological changes potentially induced by the administration of the COVID-19 vaccine.    Subject ID: [id number]     Ke Sloan presents to [facility] for On-Site 1 on 19-MAY-2025. All procedures below occurred on this date.     On-Site Visit 1                                                                Was the visit performed? Yes  Height/Weight and BMI confirmed? Yes  Repeat Height/Weight and BMI  LMP 04/20/2013    Note: Capture of the values of Height/Weight and BMI confirmation have no bearing on recruitment binning or usable data binning. The participant will remain in their bin as assigned at screening/enrollment. Sponsor requested this information to be captured but not input in to EDC.      Pregnancy Test Needed? No  NSAID Usage: Has the participant taken an OTC pain relief or fever reducing medication or NSAID in the last 30 days? No   The participant was reminded to refrain from NSAID usage until a fever of 102.2 is reached and/or as recommended by a medical professional.     verbalized their understanding. Given this discussion today, their medication list has been updated to reflect today as the stop date of their NSAID.       CBT Device Kit Accountability   Was the [place] Dispensed? Yes  [place] ID: [id number]     Pill 1 Pill 2   Serial Number [id number]   Lot Number [id number] 24-12   Pill Activation Time 09:34 09:34   Were the above pills activated and dispensed? Yes  Only 2 pills were activated and dispensed at this visit given the prevailing 3 pill plan as outlined in section 6.2 (pg. 73) of the MOP.     Pills were activated by the Device Manager and verified by KEVIN.       CBT Pill Ingestion Log     Pill 1:  Was Pill 1 ingested? Yes  Time of Ingestion: 13:06     Ingestion of Pill 1 was  witnessed by Vianca Masterson PA-C. Please see their documentation for further details.     Adverse Events Summary:*   Were any Adverse Events (AEs) experienced?  No    Protocol Deviation Summary:*   Were there any Protocol Deviations?:  No    *If Yes, please complete corresponding form.    Concomitant Medications Summary:    Has the subject taken any medications within 30 days prior to signing the informed consent and/or during the study? (Have they started any new medications since their last visit?) Yes, and their medications have not changed since their last visit. Please refer to that documentation for the medication list.       19-MAY-2025  Oliver Olivia

## 2025-05-21 ENCOUNTER — VIRTUAL VISIT (OUTPATIENT)
Dept: RESEARCH | Facility: CLINIC | Age: 65
End: 2025-05-21
Payer: COMMERCIAL

## 2025-05-21 DIAGNOSIS — Z00.6 RESEARCH SUBJECT: Primary | ICD-10-CM

## 2025-05-21 PROCEDURE — 99207 PR NO CHARGE NURSE ONLY: CPT | Mod: 93

## 2025-05-21 NOTE — PROGRESS NOTES
[place] Pre-Vaccine Call / Virtual Visit 2 Study Note    Study Description: The purpose of this study is to explore potential relationships between physiologic parameters collected from sensor data with physiological changes potentially induced by the administration of the COVID-19 vaccine.       Subject ID:      Was the visit performed? Yes  Location:     Virtual Visit 2: 21-MAY-2025  Time of Visit (24H): 13:30    Date/Time of Onsite Visit 2 / Vaccine Appointment Confirmed? Yes    Reminders  [x] Check compliance and address any issues   -If there are pending [place] uploads, bring gateway close to the phone  [x] Are you having any issues with your study devices? No  [x] Continue to follow nightly/daily study procedures   [x] Are you sick today or experiencing any cold/flu-like symptoms today? No     -If yes, investigate if rescheduling vaccine appointment is required  [x] Please remember to ingest Pill #2 tonight/the evening before your vaccine appointment    -As long as Pill #2 has not turned and stayed red in the [place]oo linda   [x] Please bring your Study Watch, Study Phone, Subject Instructions AND GATEWAY to your appointment tomorrow.       Additional Notes: N/A    Adverse Events Summary:*   Were any Adverse Events (AEs) experienced?  No    Protocol Deviation Summary:*   Were there any Protocol Deviations?:  No    *If Yes, please complete corresponding form.    Concomitant Medications Summary:    Has the subject taken any medications within 30 days prior to signing the informed consent and/or during the study? (Have they started any new medications since their last appointment?)   Yes, and their medications have not changed since their last visit. Please refer to that documentation for the medication list.       21-MAY-2025     Tere Cerna

## 2025-05-22 ENCOUNTER — ALLIED HEALTH/NURSE VISIT (OUTPATIENT)
Dept: RESEARCH | Facility: CLINIC | Age: 65
End: 2025-05-22

## 2025-05-22 DIAGNOSIS — Z23 HIGH PRIORITY FOR 2019-NCOV VACCINE: ICD-10-CM

## 2025-05-22 DIAGNOSIS — Z00.6 EXAMINATION OF PARTICIPANT OR CONTROL IN CLINICAL RESEARCH: Primary | ICD-10-CM

## 2025-05-22 NOTE — PROGRESS NOTES
[place] Pill Witness Study Note  Study Description: The purpose of this study is to explore potential relationships between physiologic parameters collected from sensor data with physiological changes potentially induced by the administration of the COVID-19 vaccine.       I supervised while Ke Sloan ingested the smart sensor. Smart sensor was swallowed without complication. Participant felt well after and was able to proceed with study procedures.       22-MAY-2025     Vianca Masterson PA-C

## 2025-05-22 NOTE — PROGRESS NOTES
[place] Vaccine Day /On-Site Visit 2 Note  Study Description: The purpose of this study is to explore potential relationships between physiologic parameters collected from sensor data with physiological changes potentially induced by the administration of the COVID-19 vaccine.    Subject ID: [id number]     Ke Sloan presents to [facility] for On-Site 2 on 22-MAY-2025. All procedures below occurred on this day.      On-Site Visit 2                                                                Was the visit performed?: Yes   Did the participant miss any morning or evening surveys since their pill day? No  If yes, they need to complete makeup surveys    CBT Ingestion Log      Pill 2   Was the Smart Pill Ingested? Yes   Date of Ingestion 21-MAY-2025   Time of Ingestion 19:20   *If Pill 2 ingestion information was collected during Virtual Visit 2, please delete the Pill 2 column.       CBT Device Kit Accountability     Was a [place] Dispensed?: Yes, [id number]      Pill 3   Serial Number [id number]   Lot Number 24-12   Pill Activation Time 12:59   Were the above pills activated and dispensed? Yes  Only 1 additional pill was activated and dispensed at this visit given the prevailing 3 pill plan as outlined in section 6.2 (pg. 73) of the MOP.     Smartpill was activated by the Device Manager and verified by Yesenia DIAS     CBT Pill Ingestion Log     Pill 3:  Was Pill 3 ingested? Yes  Time of Ingestion: 13:05     Please review the provider note for vaccine eligibility screening and administration information.     Adverse Events Summary:*   Were any Adverse Events (AEs) experienced?  No    Protocol Deviation Summary:*   Were there any Protocol Deviations?:  No    *If Yes, please complete corresponding form.    Concomitant Medications Summary:    Has the subject taken any medications within 30 days prior to signing the informed consent and/or during the study? (Have they started any new medications since  their last visit?) Yes, and their medications have not changed since their last visit. Please refer to that documentation for the medication list.       22-MAY-2025   Mari Aguilar EP

## 2025-05-22 NOTE — PROGRESS NOTES
[place] Vaccine Screening & Administration Note:  Study Description: The purpose of this study is to explore potential relationships between physiologic parameters collected from sensor data with physiological changes potentially induced by the administration of the COVID-19 vaccine.    Subject ID:      I saw Ke Sloan today to review their eligibility for Covid-19 vaccination and administer the vaccine when appropriate. Vaccine information sheet (VIS) was provided to the participant.     COVID Vaccine Screening   Are you sick today or experiencing any cold/flu-like symptoms?   No  Have you received a dose of the Covid-19 vaccine before?   Yes, Moderna  Date of most recent Covid-19 vaccine dose:     29-November-2021   Do you currently have a health condition or are undergoing    treatment that makes you moderately to severely immunocompromised?* No  Have you ever had an allergic reaction to a Covid vaccine?**  No  Have you ever had an allergic reaction to another vaccine or injectable  medication?         No  Have you ever felt dizzy or faint before, during or after a shot?   No    *Ex: treatment of cancer, HIV, organ transplant recipient, immunosuppressive therapy, etc.     **This would include a severe allergic reaction (e.g., anaphylaxis that required treatment with epinephrine or caused you to go to the hospital. It would also include an allergic reaction that caused hives, swelling, or respiratory distress, including wheezing)    Vaccine Administration      Was a vaccine administered?  Yes  5 Rights of Dosing Confirmed?  Yes   -The Right patient?   -The Right drug?   -The Right time?    -The Right dose?   -The Right route?    Vaccine Administration Information  Administration Date Administration Time Initials of  Initials of Vaccine Verifier    05/22/25  13:04 MR SUGGS     Covid-19 Vaccine Information    Lot Number Box Number Expiration Route Arm    Pfizer [id number]   [id number] 11-October-2025 Intramuscular Left   Updated vaccination card and AVS were provided to the participant.    I examined the participant 15 minutes after administration. There were no complications like shortness of breath, throat/chest tightness, sore throat, wheezing or generalized itching. Injection site looks fine without redness, induration or swelling. Participant is able to proceed with study procedures.        22-MAY-2025   Vianca Masterson PA-C

## 2025-05-28 ENCOUNTER — VIRTUAL VISIT (OUTPATIENT)
Dept: RESEARCH | Facility: CLINIC | Age: 65
End: 2025-05-28

## 2025-05-28 DIAGNOSIS — Z00.6 RESEARCH SUBJECT: Primary | ICD-10-CM

## 2025-05-28 PROCEDURE — 99207 PR NO CHARGE NURSE ONLY: CPT | Mod: 93

## 2025-05-28 NOTE — PROGRESS NOTES
[place] Post-VD Call / Virtual Visit 3 Note    Study Description: The purpose of this study is to explore potential relationships between physiologic parameters collected from sensor data with physiological changes potentially induced by the administration of the COVID-19 vaccine.       Subject ID:      Was the visit performed? Yes  Location:     Virtual Visit 3: 28-MAY-2025  Time of Visit (24H): 10:00      Reminders  [x] Check compliance and address any issues   -If there are pending [employer] uploads, bring gateway close to the phone  [x] Are you having any issues with your study devices? No   [x] Continue to follow nightly/daily study procedures  [x] Are you sick today or experiencing any cold/flu-like symptoms? No      Additional Notes:  All 3 pills are red.        Adverse Events Summary:*   Were any Adverse Events (AEs) experienced?  No    Protocol Deviation Summary:*   Were there any Protocol Deviations?:  No    *If Yes, please complete corresponding form.    Concomitant Medications Summary:    Has the subject taken any medications within 30 days prior to signing the informed consent and/or during the study? (Have they started any new medications since their last appointment?) Yes, and their medications have not changed since their last visit. Please refer to that documentation for the medication list.       28-MAY-2025   Melina Gilliam RN

## 2025-06-24 ENCOUNTER — ALLIED HEALTH/NURSE VISIT (OUTPATIENT)
Dept: RESEARCH | Facility: CLINIC | Age: 65
End: 2025-06-24
Payer: COMMERCIAL

## 2025-06-24 DIAGNOSIS — Z00.6 RESEARCH SUBJECT: Primary | ICD-10-CM

## 2025-06-24 PROCEDURE — 99207 PR NO CHARGE-RESEARCH SERVICE: CPT

## 2025-06-24 NOTE — PROGRESS NOTES
[place] End of Study Note  -Including Device Accountability Returned and Subject Disposition    Study Description: The purpose of this study is to explore potential relationships between physiologic parameters collected from sensor data with physiological changes potentially induced by the administration of the COVID-19 vaccine.       Subject ID:        Was the visit performed?  Yes   Was Study Exit Survey Completed on the study phone?: No. Due to the [employer] Builder 24 outage, the participant completed the Study Exit Survey on paper.     Subject Disposition:  Did the subject complete the study? Yes   If no, Why? N/A    Which Visit did the participant exit from the study? End of Study Visit  Study Completion Date: 2025      [place] Device Accountability Returned Form    Was the Device Kit Returned? Yes   Date Device Kit Returned:  2025   Were There Device Issues?  Yes: HSA Builder 24 .    Was the Phone Returned?   Returned Phone ID: Yes  [id number]   Was the Watch Returned?   Returned Watch ID: Yes  [id number]   Was the [place] Returned?   Returned [place] ID: Yes  [id number]   Were All Chargers and Accessories Returned?  Yes        Adverse Events Summary:*   Were any Adverse Events (AEs) experienced? No    Protocol Deviation Summary:*   Were there any Protocol Deviations?:  No    *If Yes, please complete corresponding form.    Concomitant Medications Summary:    Has the subject taken any medications within 30 days prior to signing the informed consent and/or during the study? (Have they started any new medications?) Yes, and their medications have not changed since their last visit. Please refer to that documentation for the medication list.     2025   Karla Ramey

## 2025-06-25 NOTE — PROGRESS NOTES
[place] Study Reconsent    Study Description: The purpose of this study is to explore potential relationships between physiologic parameters collected from sensor data with physiological changes potentially induced by the administration of the COVID-19 vaccine.    Ke Sloan a 65 year old female, was on-site today at [facility] for a study visit for the [place] (-[id number]) study.       The new version of the consent form was reviewed with the patient.     The review of the new version included:  Study Purpose      Participant Duration, Responsibilities & Expectations    Study Data and Devices  Benefits and Risks of Participation  Compensation and Costs of Participation - Additional information  Coded Study Data  Voluntary Participation    Study Restrictions  Confidentiality Obligations/Privacy-Related Risks   Injury, Legal, and Data Rights    Authorization to Use and Disclose Your Protected Health Information    Protocol Version: 4.0   Principle Investigator: Kaveh Colmenares MD    Subject Number: [id number]     The reconsent eConsent and HIPAA form version (Version 6.0 Date 23-JUN-2025) was signed on  24-JUN-2025. The updated copy of the consent form was provided to the participant and saved in the participant's medical record alongside the original consent.     All participants questions were answered and they are still willing to participate in the study.       24-JUN-2025    Karla Ramey

## 2025-07-02 ENCOUNTER — MYC MEDICAL ADVICE (OUTPATIENT)
Dept: FAMILY MEDICINE | Facility: CLINIC | Age: 65
End: 2025-07-02

## 2025-07-02 DIAGNOSIS — F41.1 GENERALIZED ANXIETY DISORDER: ICD-10-CM

## 2025-07-02 DIAGNOSIS — I10 ESSENTIAL HYPERTENSION, BENIGN: ICD-10-CM

## 2025-07-02 DIAGNOSIS — I49.3 PVC'S (PREMATURE VENTRICULAR CONTRACTIONS): ICD-10-CM

## 2025-07-02 RX ORDER — METOPROLOL SUCCINATE 50 MG/1
50 TABLET, EXTENDED RELEASE ORAL DAILY
Qty: 7 TABLET | Refills: 0 | Status: SHIPPED | OUTPATIENT
Start: 2025-07-02

## 2025-07-02 RX ORDER — LISINOPRIL AND HYDROCHLOROTHIAZIDE 12.5; 2 MG/1; MG/1
1 TABLET ORAL DAILY
COMMUNITY
Start: 2025-07-02

## 2025-07-02 RX ORDER — METOPROLOL SUCCINATE 50 MG/1
50 TABLET, EXTENDED RELEASE ORAL DAILY
COMMUNITY
Start: 2025-07-02

## 2025-07-02 RX ORDER — SERTRALINE HYDROCHLORIDE 100 MG/1
100 TABLET, FILM COATED ORAL DAILY
Qty: 7 TABLET | Refills: 0 | Status: SHIPPED | OUTPATIENT
Start: 2025-07-02

## 2025-07-02 RX ORDER — SERTRALINE HYDROCHLORIDE 100 MG/1
100 TABLET, FILM COATED ORAL DAILY
COMMUNITY
Start: 2025-07-02

## 2025-07-02 RX ORDER — LISINOPRIL AND HYDROCHLOROTHIAZIDE 12.5; 2 MG/1; MG/1
1 TABLET ORAL DAILY
Qty: 7 TABLET | Refills: 0 | Status: SHIPPED | OUTPATIENT
Start: 2025-07-02

## 2025-07-02 NOTE — TELEPHONE ENCOUNTER
Ke Sloan is requesting a refill of:    Pending Prescriptions:                       Disp   Refills    lisinopril-hydrochlorothiazide (ZESTORETI*7 tabl*0            Sig: Take 1 tablet by mouth daily.    metoprolol succinate ER (TOPROL XL) 50 MG*7 tabl*0            Sig: Take 1 tablet (50 mg) by mouth daily.    sertraline (ZOLOFT) 100 MG tablet         7 tabl*0            Sig: Take 1 tablet (100 mg) by mouth daily.    Pt has OV for refills

## 2025-07-02 NOTE — TELEPHONE ENCOUNTER
Ke Sloan is requesting a refill of:    Refused Prescriptions:                       Disp   Refills    lisinopril-hydrochlorothiazide (ZESTORETIC*                Sig: TAKE 1 TABLET BY MOUTH DAILY  Refused By: CLINTON MISTRY  Reason for Refusal: Patient needs appointment    metoprolol succinate ER (TOPROL XL) 50 MG *                Sig: TAKE 1 TABLET(50 MG) BY MOUTH DAILY  Refused By: CLINTON MISTRY  Reason for Refusal: Patient needs appointment    sertraline (ZOLOFT) 100 MG tablet [Pharmac*                Sig: TAKE 1 TABLET(100 MG) BY MOUTH DAILY  Refused By: CLINTON MISTRY  Reason for Refusal: Patient needs appointment    Per last refill encounter: Medications are stable for pt. She had labs with px 8/2024. Refilled 6 months. Ideally would follow-up 8/2025 or sooner when she is back from [place]. Could do short extension to 8/2025 if needed once scheduled.

## 2025-07-10 ENCOUNTER — OFFICE VISIT (OUTPATIENT)
Dept: FAMILY MEDICINE | Facility: CLINIC | Age: 65
End: 2025-07-10

## 2025-07-10 VITALS
BODY MASS INDEX: 30.26 KG/M2 | OXYGEN SATURATION: 97 % | SYSTOLIC BLOOD PRESSURE: 118 MMHG | WEIGHT: 193.2 LBS | TEMPERATURE: 97.6 F | DIASTOLIC BLOOD PRESSURE: 78 MMHG | HEART RATE: 67 BPM

## 2025-07-10 DIAGNOSIS — E66.811 OBESITY (BMI 30.0-34.9): ICD-10-CM

## 2025-07-10 DIAGNOSIS — Z13.220 SCREENING FOR LIPID DISORDERS: ICD-10-CM

## 2025-07-10 DIAGNOSIS — Z83.49 FAMILY HISTORY OF THYROID DISEASE: ICD-10-CM

## 2025-07-10 DIAGNOSIS — I10 ESSENTIAL HYPERTENSION, BENIGN: Primary | ICD-10-CM

## 2025-07-10 DIAGNOSIS — Z13.228 SCREENING FOR METABOLIC DISORDER: ICD-10-CM

## 2025-07-10 DIAGNOSIS — F41.1 GENERALIZED ANXIETY DISORDER: ICD-10-CM

## 2025-07-10 DIAGNOSIS — I49.3 PVC'S (PREMATURE VENTRICULAR CONTRACTIONS): ICD-10-CM

## 2025-07-10 LAB
ALBUMIN SERPL-MCNC: 4.6 G/DL (ref 3.6–5.1)
ALP SERPL-CCNC: 63 U/L (ref 33–130)
ALT 1742-6: 30 U/L (ref 0–32)
AST 1920-8: 36 U/L (ref 0–35)
BILIRUB SERPL-MCNC: 0.8 MG/DL (ref 0.2–1.2)
BUN SERPL-MCNC: 22 MG/DL (ref 7–25)
BUN/CREATININE RATIO: 22 (ref 6–32)
CALCIUM SERPL-MCNC: 10.3 MG/DL (ref 8.6–10.3)
CHLORIDE SERPLBLD-SCNC: 100.9 MMOL/L (ref 98–110)
CHOLEST SERPL-MCNC: 228 MG/DL (ref 0–199)
CHOLEST/HDLC SERPL: 5 {RATIO} (ref 0–5)
CO2 SERPL-SCNC: 29.6 MMOL/L (ref 20–32)
CREAT SERPL-MCNC: 1.01 MG/DL (ref 0.6–1.3)
GLUCOSE SERPL-MCNC: 106 MG/DL (ref 60–99)
HDLC SERPL-MCNC: 45 MG/DL (ref 40–150)
LDLC SERPL CALC-MCNC: 134 MG/DL (ref 0–129)
POTASSIUM SERPL-SCNC: 4.28 MMOL/L (ref 3.5–5.3)
PROT SERPL-MCNC: 8.3 G/DL (ref 6.1–8.1)
SODIUM SERPL-SCNC: 137.2 MMOL/L (ref 135–146)
TRIGL SERPL-MCNC: 244 MG/DL (ref 0–149)

## 2025-07-10 RX ORDER — LISINOPRIL AND HYDROCHLOROTHIAZIDE 12.5; 2 MG/1; MG/1
1 TABLET ORAL DAILY
Qty: 90 TABLET | Refills: 3 | Status: SHIPPED | OUTPATIENT
Start: 2025-07-10

## 2025-07-10 RX ORDER — METOPROLOL SUCCINATE 50 MG/1
50 TABLET, EXTENDED RELEASE ORAL DAILY
Qty: 90 TABLET | Refills: 3 | Status: SHIPPED | OUTPATIENT
Start: 2025-07-10

## 2025-07-10 RX ORDER — SERTRALINE HYDROCHLORIDE 100 MG/1
100 TABLET, FILM COATED ORAL DAILY
Qty: 90 TABLET | Refills: 3 | Status: SHIPPED | OUTPATIENT
Start: 2025-07-10

## 2025-07-10 ASSESSMENT — ANXIETY QUESTIONNAIRES
GAD7 TOTAL SCORE: 0
5. BEING SO RESTLESS THAT IT IS HARD TO SIT STILL: NOT AT ALL
GAD7 TOTAL SCORE: 0
3. WORRYING TOO MUCH ABOUT DIFFERENT THINGS: NOT AT ALL
7. FEELING AFRAID AS IF SOMETHING AWFUL MIGHT HAPPEN: NOT AT ALL
2. NOT BEING ABLE TO STOP OR CONTROL WORRYING: NOT AT ALL
6. BECOMING EASILY ANNOYED OR IRRITABLE: NOT AT ALL
1. FEELING NERVOUS, ANXIOUS, OR ON EDGE: NOT AT ALL

## 2025-07-10 ASSESSMENT — PATIENT HEALTH QUESTIONNAIRE - PHQ9
SUM OF ALL RESPONSES TO PHQ QUESTIONS 1-9: 1
5. POOR APPETITE OR OVEREATING: NOT AT ALL

## 2025-07-10 NOTE — NURSING NOTE
Chief Complaint   Patient presents with    Recheck Medication     Fasting med check and refill      Pre-visit Screening:  Immunizations:  not up to date - declined  Colonoscopy:  is up to date  Mammogram: is up to date  Asthma Action Test/Plan:  na  PHQ9:  given  GAD7:  given  Questioned patient about current smoking habits Pt. has never smoked.  Ok to leave detailed message on voice mail for today's visit only yes, phone # [phone] (home)

## 2025-07-10 NOTE — PROGRESS NOTES
CC: Medication Check    History:  HTN, PVCs:  Stable on lisinopril-hydrochlorothiazide, metoprolol XL. Takes consistently. No side effects. Does not check her BP at home. Denies chest pain, palpitations, SOB. Tries to stay active, but doesn't do much formal exercise.      Anxiety:  Taking sertraline 100 mg daily. This is working well for her. Not interested in changes.     PMH, MEDICATIONS, ALLERGIES, SOCIAL AND FAMILY HISTORY in [facility] and reviewed by me personally.    ROS negative other than the symptoms noted above in the HPI.      Examination   /78 (BP Location: Left arm, Patient Position: Sitting, Cuff Size: Adult Regular)   Pulse 67   Temp 97.6  F (36.4  C) (Temporal)   Wt 87.6 kg (193 lb 3.2 oz)   LMP 04/20/2013   SpO2 97%   BMI 30.26 kg/m       Constitutional: Sitting comfortably, in no acute distress. Vital signs noted  Neck:  no adenopathy, trachea midline and normal to palpation, thyroid normal to palpation  Cardiovascular:  regular rate and rhythm, no murmurs, clicks, or gallops. Frequent PVCs  Respiratory:  normal respiratory rate and rhythm, lungs clear to auscultation  SKIN: No jaundice/pallor/rash.   Psychiatric: mentation appears normal and affect normal/bright    A/P    ICD-10-CM    1. Essential hypertension, benign  I10 lisinopril-hydrochlorothiazide (ZESTORETIC) 20-12.5 MG tablet     metoprolol succinate ER (TOPROL XL) 50 MG 24 hr tablet     VENOUS COLLECTION     Comprehensive Metobolic Panel ([facility])      2. PVC's (premature ventricular contractions)  I49.3 metoprolol succinate ER (TOPROL XL) 50 MG 24 hr tablet      3. Generalized anxiety disorder  F41.1 sertraline (ZOLOFT) 100 MG tablet      4. Screening for lipid disorders  Z13.220 VENOUS COLLECTION     Lipid Panel ([facility])      5. Screening for metabolic disorder  Z13.228 VENOUS COLLECTION     Comprehensive Metobolic Panel ([facility])      6. Family history of thyroid disease  Z83.49 TSH WITH FREE T4 REFLEX ([facility])      7. Obesity (BMI  30.0-34.9)  T00.325         DISCUSSION:  HTN, PVCs:  BP well controlled today. Will check fasting labs today and send MyChart with results when available. Will refill medication without change for 1 year.      Anxiety:  PHQ-9 and BRIAN-7 updated today and well controlled. Agreed to refill current medication without change for 1 year. Contact us sooner with concerns, worsening.    Obesity:  Pt expressed frustration with weight. Will check TSH given strong FH thyroid disease. Discussed diet in general and ways to make small changes to improve diet, and hopefully lead to weight loss. Recommended taking 2-3 days to track protein in grams, and carbohydrates in grams.  Protein goal: 60 g day or day.  Carbohydrates:  Goal 200 g/day or less.  Increase muscle building exercise.    follow up visit: 1 year    Mari Mercer PA-C  [facility]

## 2025-07-13 ENCOUNTER — RESULTS FOLLOW-UP (OUTPATIENT)
Dept: FAMILY MEDICINE | Facility: CLINIC | Age: 65
End: 2025-07-13